# Patient Record
Sex: MALE | Race: ASIAN | NOT HISPANIC OR LATINO
[De-identification: names, ages, dates, MRNs, and addresses within clinical notes are randomized per-mention and may not be internally consistent; named-entity substitution may affect disease eponyms.]

---

## 2017-01-20 ENCOUNTER — APPOINTMENT (OUTPATIENT)
Dept: UROLOGY | Facility: CLINIC | Age: 77
End: 2017-01-20

## 2017-06-27 ENCOUNTER — RX RENEWAL (OUTPATIENT)
Age: 77
End: 2017-06-27

## 2017-07-20 ENCOUNTER — APPOINTMENT (OUTPATIENT)
Dept: UROLOGY | Facility: CLINIC | Age: 77
End: 2017-07-20

## 2017-07-20 VITALS
SYSTOLIC BLOOD PRESSURE: 120 MMHG | DIASTOLIC BLOOD PRESSURE: 74 MMHG | HEART RATE: 60 BPM | HEIGHT: 68 IN | RESPIRATION RATE: 17 BRPM | WEIGHT: 170 LBS | TEMPERATURE: 98.4 F | BODY MASS INDEX: 25.76 KG/M2

## 2017-07-20 DIAGNOSIS — F15.90 OTHER STIMULANT USE, UNSPECIFIED, UNCOMPLICATED: ICD-10-CM

## 2017-07-20 DIAGNOSIS — Z86.39 PERSONAL HISTORY OF OTHER ENDOCRINE, NUTRITIONAL AND METABOLIC DISEASE: ICD-10-CM

## 2017-07-20 DIAGNOSIS — Z80.1 FAMILY HISTORY OF MALIGNANT NEOPLASM OF TRACHEA, BRONCHUS AND LUNG: ICD-10-CM

## 2017-07-20 DIAGNOSIS — Z86.79 PERSONAL HISTORY OF OTHER DISEASES OF THE CIRCULATORY SYSTEM: ICD-10-CM

## 2017-07-20 DIAGNOSIS — Z82.3 FAMILY HISTORY OF STROKE: ICD-10-CM

## 2017-07-20 DIAGNOSIS — Z87.438 PERSONAL HISTORY OF OTHER DISEASES OF MALE GENITAL ORGANS: ICD-10-CM

## 2017-07-20 DIAGNOSIS — Z80.8 FAMILY HISTORY OF MALIGNANT NEOPLASM OF OTHER ORGANS OR SYSTEMS: ICD-10-CM

## 2017-07-26 LAB
APPEARANCE: CLEAR
BACTERIA UR CULT: ABNORMAL
BACTERIA: ABNORMAL
BILIRUBIN URINE: NEGATIVE
BLOOD URINE: NEGATIVE
COLOR: ABNORMAL
GLUCOSE QUALITATIVE U: 100
HYALINE CASTS: 0 /LPF
KETONES URINE: NEGATIVE
LEUKOCYTE ESTERASE URINE: NEGATIVE
MICROSCOPIC-UA: NORMAL
NITRITE URINE: POSITIVE
PH URINE: 7.5
PROTEIN URINE: NEGATIVE
RED BLOOD CELLS URINE: 1 /HPF
SPECIFIC GRAVITY URINE: 1.02
SQUAMOUS EPITHELIAL CELLS: 2 /HPF
UROBILINOGEN URINE: 1
WHITE BLOOD CELLS URINE: 6 /HPF

## 2017-08-03 ENCOUNTER — OTHER (OUTPATIENT)
Age: 77
End: 2017-08-03

## 2017-08-03 DIAGNOSIS — N39.0 URINARY TRACT INFECTION, SITE NOT SPECIFIED: ICD-10-CM

## 2018-02-12 ENCOUNTER — APPOINTMENT (OUTPATIENT)
Dept: UROLOGY | Facility: CLINIC | Age: 78
End: 2018-02-12
Payer: MEDICARE

## 2018-02-12 VITALS
WEIGHT: 169 LBS | TEMPERATURE: 97.5 F | BODY MASS INDEX: 25.61 KG/M2 | RESPIRATION RATE: 16 BRPM | DIASTOLIC BLOOD PRESSURE: 78 MMHG | HEART RATE: 61 BPM | HEIGHT: 68 IN | SYSTOLIC BLOOD PRESSURE: 132 MMHG

## 2018-02-12 PROCEDURE — 99213 OFFICE O/P EST LOW 20 MIN: CPT

## 2018-02-14 LAB
APPEARANCE: CLEAR
BACTERIA UR CULT: NORMAL
BACTERIA: NEGATIVE
BILIRUBIN URINE: NEGATIVE
BLOOD URINE: NEGATIVE
COLOR: ABNORMAL
GLUCOSE QUALITATIVE U: 250 MG/DL
KETONES URINE: NEGATIVE
LEUKOCYTE ESTERASE URINE: NEGATIVE
MICROSCOPIC-UA: NORMAL
NITRITE URINE: POSITIVE
PH URINE: 5.5
PROTEIN URINE: NEGATIVE MG/DL
RED BLOOD CELLS URINE: 1 /HPF
SPECIFIC GRAVITY URINE: 1.02
SQUAMOUS EPITHELIAL CELLS: 0 /HPF
UROBILINOGEN URINE: NEGATIVE MG/DL
WHITE BLOOD CELLS URINE: 1 /HPF

## 2018-06-27 ENCOUNTER — RX RENEWAL (OUTPATIENT)
Age: 78
End: 2018-06-27

## 2018-08-15 ENCOUNTER — APPOINTMENT (OUTPATIENT)
Dept: UROLOGY | Facility: CLINIC | Age: 78
End: 2018-08-15
Payer: MEDICARE

## 2018-08-15 VITALS
SYSTOLIC BLOOD PRESSURE: 144 MMHG | RESPIRATION RATE: 17 BRPM | OXYGEN SATURATION: 96 % | TEMPERATURE: 97.4 F | WEIGHT: 173 LBS | BODY MASS INDEX: 26.3 KG/M2 | HEART RATE: 73 BPM | DIASTOLIC BLOOD PRESSURE: 75 MMHG

## 2018-08-15 PROCEDURE — 99213 OFFICE O/P EST LOW 20 MIN: CPT

## 2019-02-13 ENCOUNTER — APPOINTMENT (OUTPATIENT)
Age: 79
End: 2019-02-13
Payer: MEDICARE

## 2019-02-13 VITALS
BODY MASS INDEX: 25.85 KG/M2 | SYSTOLIC BLOOD PRESSURE: 133 MMHG | TEMPERATURE: 97.9 F | OXYGEN SATURATION: 96 % | HEART RATE: 70 BPM | DIASTOLIC BLOOD PRESSURE: 67 MMHG | WEIGHT: 170 LBS | RESPIRATION RATE: 17 BRPM

## 2019-02-13 PROCEDURE — 99213 OFFICE O/P EST LOW 20 MIN: CPT | Mod: 25

## 2019-02-13 PROCEDURE — 51798 US URINE CAPACITY MEASURE: CPT

## 2019-02-13 RX ORDER — TADALAFIL 20 MG/1
20 TABLET, FILM COATED ORAL
Qty: 10 | Refills: 3 | Status: COMPLETED | COMMUNITY
Start: 2017-07-20 | End: 2019-02-13

## 2019-02-15 LAB
APPEARANCE: CLEAR
BACTERIA UR CULT: NORMAL
BACTERIA: NEGATIVE
BILIRUBIN URINE: NEGATIVE
BLOOD URINE: NEGATIVE
CALCIUM OXALATE CRYSTALS: ABNORMAL
COLOR: ABNORMAL
GLUCOSE QUALITATIVE U: NEGATIVE MG/DL
GRANULAR CASTS: 0 /LPF
HYALINE CASTS: 0 /LPF
KETONES URINE: NEGATIVE
LEUKOCYTE ESTERASE URINE: NEGATIVE
MICROSCOPIC-UA: NORMAL
NITRITE URINE: POSITIVE
PH URINE: 7
PROTEIN URINE: NEGATIVE MG/DL
RED BLOOD CELLS URINE: 0 /HPF
SPECIFIC GRAVITY URINE: 1.02
SQUAMOUS EPITHELIAL CELLS: 0 /HPF
TRIPLE PHOSPHATE CRYSTALS: NEGATIVE
URIC ACID CRYSTALS: NEGATIVE
UROBILINOGEN URINE: NEGATIVE MG/DL
WHITE BLOOD CELLS URINE: 1 /HPF

## 2019-02-15 NOTE — PHYSICAL EXAM
[General Appearance - Well Developed] : well developed [General Appearance - Well Nourished] : well nourished [Normal Appearance] : normal appearance [Well Groomed] : well groomed [General Appearance - In No Acute Distress] : no acute distress [Abdomen Soft] : soft [Abdomen Tenderness] : non-tender [Costovertebral Angle Tenderness] : no ~M costovertebral angle tenderness [Urethral Meatus] : meatus normal [Penis Abnormality] : normal circumcised penis [Urinary Bladder Findings] : the bladder was normal on palpation [Scrotum] : the scrotum was normal [Rectal Exam - Seminal Vesicles] : the seminal vesicles were normal [Epididymis] : the epididymides were normal [Testes Tenderness] : no tenderness of the testes [Testes Mass (___cm)] : there were no testicular masses [Anus Abnormality] : the anus and perineum were normal [Rectal Exam - Rectum] : digital rectal exam was normal [Prostate Tenderness] : the prostate was not tender [No Prostate Nodules] : no prostate nodules [Prostate Size ___ gm] : prostate size [unfilled] gm [Edema] : no peripheral edema [] : no respiratory distress [Respiration, Rhythm And Depth] : normal respiratory rhythm and effort [Exaggerated Use Of Accessory Muscles For Inspiration] : no accessory muscle use [Oriented To Time, Place, And Person] : oriented to person, place, and time [Affect] : the affect was normal [Mood] : the mood was normal [Not Anxious] : not anxious [Normal Station and Gait] : the gait and station were normal for the patient's age [No Focal Deficits] : no focal deficits [Motor Exam] : the motor exam was normal [No Palpable Adenopathy] : no palpable adenopathy

## 2019-02-15 NOTE — HISTORY OF PRESENT ILLNESS
[FreeTextEntry1] : 76 yo French speaking M with longstanding history of BPH for which he has been on tamsulosin 0.8mg and finasteride. Patient states that his LUTS has been relatively stable. Does continue to have some weak stream and nocturia (1-2/night) but not very bothersome. Denies any gross hematuria, dysuria, history of UTI, nephrolithiasis. \par \par Doing well with no issues since last visit.\par \par Interval history: No significant issues. Had Abd US done recently with PCP and was told he had a possible kidney stone

## 2019-02-15 NOTE — ASSESSMENT
[FreeTextEntry1] : 79 yo M with history of BPH, Possible renal stone on abd US per pt\par \par - PVR = 0ml\par - Continue tamsulosin and finasteride\par - Will obtain renal US report from PCP\par - UA and culture\par - FU in 6 months

## 2019-08-14 ENCOUNTER — APPOINTMENT (OUTPATIENT)
Age: 79
End: 2019-08-14
Payer: MEDICARE

## 2019-08-14 VITALS
WEIGHT: 167 LBS | DIASTOLIC BLOOD PRESSURE: 77 MMHG | HEART RATE: 72 BPM | OXYGEN SATURATION: 96 % | SYSTOLIC BLOOD PRESSURE: 159 MMHG | RESPIRATION RATE: 17 BRPM | TEMPERATURE: 97.4 F | BODY MASS INDEX: 25.39 KG/M2

## 2019-08-14 PROCEDURE — 99213 OFFICE O/P EST LOW 20 MIN: CPT

## 2019-08-14 RX ORDER — SULFAMETHOXAZOLE AND TRIMETHOPRIM 800; 160 MG/1; MG/1
800-160 TABLET ORAL TWICE DAILY
Qty: 10 | Refills: 0 | Status: COMPLETED | COMMUNITY
Start: 2017-08-03 | End: 2019-08-14

## 2019-08-14 NOTE — PHYSICAL EXAM
[General Appearance - Well Developed] : well developed [General Appearance - Well Nourished] : well nourished [Normal Appearance] : normal appearance [Well Groomed] : well groomed [General Appearance - In No Acute Distress] : no acute distress [Abdomen Soft] : soft [Abdomen Tenderness] : non-tender [Costovertebral Angle Tenderness] : no ~M costovertebral angle tenderness [Penis Abnormality] : normal circumcised penis [Urethral Meatus] : meatus normal [Scrotum] : the scrotum was normal [Urinary Bladder Findings] : the bladder was normal on palpation [Rectal Exam - Seminal Vesicles] : the seminal vesicles were normal [Epididymis] : the epididymides were normal [Testes Tenderness] : no tenderness of the testes [Testes Mass (___cm)] : there were no testicular masses [Anus Abnormality] : the anus and perineum were normal [Rectal Exam - Rectum] : digital rectal exam was normal [Prostate Tenderness] : the prostate was not tender [No Prostate Nodules] : no prostate nodules [Prostate Size ___ gm] : prostate size [unfilled] gm [Edema] : no peripheral edema [] : no respiratory distress [Respiration, Rhythm And Depth] : normal respiratory rhythm and effort [Exaggerated Use Of Accessory Muscles For Inspiration] : no accessory muscle use [Oriented To Time, Place, And Person] : oriented to person, place, and time [Affect] : the affect was normal [Mood] : the mood was normal [Not Anxious] : not anxious [Normal Station and Gait] : the gait and station were normal for the patient's age [No Focal Deficits] : no focal deficits [No Palpable Adenopathy] : no palpable adenopathy [Motor Exam] : the motor exam was normal

## 2019-08-16 NOTE — ASSESSMENT
[FreeTextEntry1] : 80 yo M with BPH\par \par - Continue dual medical therapy with tamsulosin and finasteride\par - FU in 6 months\par \par

## 2019-08-16 NOTE — HISTORY OF PRESENT ILLNESS
[FreeTextEntry1] : 76 yo Welsh speaking M with longstanding history of BPH for which he has been on tamsulosin 0.8mg and finasteride. Patient states that his LUTS has been relatively stable. Does continue to have some weak stream and nocturia (1-2/night) but not very bothersome. Denies any gross hematuria, dysuria, history of UTI, nephrolithiasis. \par \par 8/15/18 Interval history: Doing well with no issues since last visit.\par \par 2/13/19 Interval history: No significant issues. Had Abd US done recently with PCP and was told he had a possible kidney stone\par \par 8/16/19 Interval history: Doing well since last visit with stable LUTS. Abd US report actually showed gallstones, not kidney stones\par Interval history:

## 2020-02-12 ENCOUNTER — APPOINTMENT (OUTPATIENT)
Age: 80
End: 2020-02-12
Payer: MEDICARE

## 2020-02-12 VITALS
BODY MASS INDEX: 25.85 KG/M2 | HEART RATE: 67 BPM | SYSTOLIC BLOOD PRESSURE: 117 MMHG | DIASTOLIC BLOOD PRESSURE: 70 MMHG | WEIGHT: 170 LBS | RESPIRATION RATE: 17 BRPM | TEMPERATURE: 98.4 F | OXYGEN SATURATION: 96 %

## 2020-02-12 PROCEDURE — 99213 OFFICE O/P EST LOW 20 MIN: CPT

## 2020-02-12 NOTE — PHYSICAL EXAM
[General Appearance - Well Developed] : well developed [General Appearance - Well Nourished] : well nourished [Normal Appearance] : normal appearance [Well Groomed] : well groomed [General Appearance - In No Acute Distress] : no acute distress [Abdomen Soft] : soft [Abdomen Tenderness] : non-tender [Costovertebral Angle Tenderness] : no ~M costovertebral angle tenderness [Urethral Meatus] : meatus normal [Urinary Bladder Findings] : the bladder was normal on palpation [Penis Abnormality] : normal circumcised penis [Scrotum] : the scrotum was normal [Rectal Exam - Seminal Vesicles] : the seminal vesicles were normal [Epididymis] : the epididymides were normal [Testes Tenderness] : no tenderness of the testes [Testes Mass (___cm)] : there were no testicular masses [Anus Abnormality] : the anus and perineum were normal [Rectal Exam - Rectum] : digital rectal exam was normal [Prostate Tenderness] : the prostate was not tender [No Prostate Nodules] : no prostate nodules [Prostate Size ___ gm] : prostate size [unfilled] gm [Edema] : no peripheral edema [] : no respiratory distress [Respiration, Rhythm And Depth] : normal respiratory rhythm and effort [Exaggerated Use Of Accessory Muscles For Inspiration] : no accessory muscle use [Oriented To Time, Place, And Person] : oriented to person, place, and time [Affect] : the affect was normal [Mood] : the mood was normal [Not Anxious] : not anxious [Normal Station and Gait] : the gait and station were normal for the patient's age [No Focal Deficits] : no focal deficits [Motor Exam] : the motor exam was normal [No Palpable Adenopathy] : no palpable adenopathy

## 2020-02-12 NOTE — HISTORY OF PRESENT ILLNESS
[FreeTextEntry1] : 76 yo Mongolian speaking M with longstanding history of BPH for which he has been on tamsulosin 0.8mg and finasteride. Patient states that his LUTS has been relatively stable. Does continue to have some weak stream and nocturia (1-2/night) but not very bothersome. Denies any gross hematuria, dysuria, history of UTI, nephrolithiasis. \par \par 8/15/18 Interval history: Doing well with no issues since last visit.\par \par 2/13/19 Interval history: No significant issues. Had Abd US done recently with PCP and was told he had a possible kidney stone\par \par 8/16/19 Interval history: Doing well since last visit with stable LUTS. Abd US report actually showed gallstones, not kidney stones\par \par 2/12/20 Interval history: Changed PCP since last visit\par Stable LUTS but right sided hernia has been bothering him lately\par 1 week ago had labwork and urine test with PCP

## 2020-02-12 NOTE — ASSESSMENT
[FreeTextEntry1] : 80 yo M with BPH on dual medical therapy\par \par - Doing well with stable LUTS\par - Discussed with pt that if hernia continues to be bothersome, can consider surgical intervention with general surgery\par - FU in 6 months or as needed

## 2020-06-11 ENCOUNTER — RX RENEWAL (OUTPATIENT)
Age: 80
End: 2020-06-11

## 2020-08-12 ENCOUNTER — APPOINTMENT (OUTPATIENT)
Dept: UROLOGY | Facility: CLINIC | Age: 80
End: 2020-08-12
Payer: MEDICARE

## 2020-08-12 VITALS
BODY MASS INDEX: 24.94 KG/M2 | RESPIRATION RATE: 17 BRPM | WEIGHT: 164 LBS | HEART RATE: 63 BPM | DIASTOLIC BLOOD PRESSURE: 68 MMHG | OXYGEN SATURATION: 95 % | TEMPERATURE: 98.3 F | SYSTOLIC BLOOD PRESSURE: 122 MMHG

## 2020-08-12 PROCEDURE — 99213 OFFICE O/P EST LOW 20 MIN: CPT

## 2020-08-12 NOTE — PHYSICAL EXAM
[General Appearance - Well Developed] : well developed [Normal Appearance] : normal appearance [General Appearance - Well Nourished] : well nourished [Well Groomed] : well groomed [General Appearance - In No Acute Distress] : no acute distress [Abdomen Soft] : soft [Abdomen Tenderness] : non-tender [Costovertebral Angle Tenderness] : no ~M costovertebral angle tenderness [Urethral Meatus] : meatus normal [Penis Abnormality] : normal circumcised penis [Urinary Bladder Findings] : the bladder was normal on palpation [Scrotum] : the scrotum was normal [Rectal Exam - Seminal Vesicles] : the seminal vesicles were normal [Epididymis] : the epididymides were normal [Testes Tenderness] : no tenderness of the testes [Testes Mass (___cm)] : there were no testicular masses [Anus Abnormality] : the anus and perineum were normal [Rectal Exam - Rectum] : digital rectal exam was normal [Prostate Tenderness] : the prostate was not tender [No Prostate Nodules] : no prostate nodules [Prostate Size ___ gm] : prostate size [unfilled] gm [Edema] : no peripheral edema [] : no respiratory distress [Respiration, Rhythm And Depth] : normal respiratory rhythm and effort [Exaggerated Use Of Accessory Muscles For Inspiration] : no accessory muscle use [Oriented To Time, Place, And Person] : oriented to person, place, and time [Affect] : the affect was normal [Not Anxious] : not anxious [Mood] : the mood was normal [No Focal Deficits] : no focal deficits [Normal Station and Gait] : the gait and station were normal for the patient's age [Motor Exam] : the motor exam was normal [No Palpable Adenopathy] : no palpable adenopathy [FreeTextEntry1] : deferred today

## 2020-08-12 NOTE — HISTORY OF PRESENT ILLNESS
[FreeTextEntry1] : 76 yo Albanian speaking M with longstanding history of BPH for which he has been on tamsulosin 0.8mg and finasteride. Patient states that his LUTS has been relatively stable. Does continue to have some weak stream and nocturia (1-2/night) but not very bothersome. Denies any gross hematuria, dysuria, history of UTI, nephrolithiasis. \par \par 8/15/18 Interval history: Doing well with no issues since last visit.\par \par 2/13/19 Interval history: No significant issues. Had Abd US done recently with PCP and was told he had a possible kidney stone\par \par 8/16/19 Interval history: Doing well since last visit with stable LUTS. Abd US report actually showed gallstones, not kidney stones\par \par 2/12/20 Interval history: Changed PCP since last visit\par Stable LUTS but right sided hernia has been bothering him lately\par 1 week ago had labwork and urine test with PCP\par \par 8/12/20 Interval history: PCP advised pt to stop finasteride and cut down tamsulosin to once daily\par Has noticed some worsening of urine flow over the last month since changing meds\par No recent gross hematuria, dysuria

## 2020-08-17 LAB
APPEARANCE: CLEAR
BILIRUBIN URINE: NEGATIVE
BLOOD URINE: NEGATIVE
COLOR: YELLOW
GLUCOSE QUALITATIVE U: ABNORMAL
KETONES URINE: NEGATIVE
LEUKOCYTE ESTERASE URINE: NEGATIVE
NITRITE URINE: NEGATIVE
PH URINE: 6
PROTEIN URINE: NEGATIVE
SPECIFIC GRAVITY URINE: 1.01
UROBILINOGEN URINE: NORMAL

## 2020-12-15 PROBLEM — N39.0 ACUTE UTI: Status: RESOLVED | Noted: 2017-08-03 | Resolved: 2020-12-15

## 2021-02-03 ENCOUNTER — APPOINTMENT (OUTPATIENT)
Dept: OPHTHALMOLOGY | Facility: CLINIC | Age: 81
End: 2021-02-03

## 2021-02-10 ENCOUNTER — APPOINTMENT (OUTPATIENT)
Age: 81
End: 2021-02-10
Payer: MEDICARE

## 2021-02-10 VITALS
TEMPERATURE: 97.1 F | BODY MASS INDEX: 25.24 KG/M2 | OXYGEN SATURATION: 95 % | HEART RATE: 69 BPM | DIASTOLIC BLOOD PRESSURE: 56 MMHG | SYSTOLIC BLOOD PRESSURE: 142 MMHG | WEIGHT: 166 LBS | RESPIRATION RATE: 17 BRPM

## 2021-02-10 PROCEDURE — 99072 ADDL SUPL MATRL&STAF TM PHE: CPT

## 2021-02-10 PROCEDURE — 99213 OFFICE O/P EST LOW 20 MIN: CPT

## 2021-02-10 NOTE — HISTORY OF PRESENT ILLNESS
[FreeTextEntry1] : 78 yo Hebrew speaking M with longstanding history of BPH for which he has been on tamsulosin 0.8mg and finasteride. Patient states that his LUTS has been relatively stable. Does continue to have some weak stream and nocturia (1-2/night) but not very bothersome. Denies any gross hematuria, dysuria, history of UTI, nephrolithiasis. \par \par 2/10/21 Interval history: Still taking tamsulosin 0.4mg\par Pt state LUTS has not been bothersome lately with OK flow and no straining\par Recently had labwork done by PCP on 1/26 and was told so rise in Cr\par PSA was 2.28 and normal UA\par Pending endocrine workup for his DM2\par

## 2021-02-10 NOTE — ASSESSMENT
[FreeTextEntry1] : 81 yo M with stable chronic BPH\par \par - Reviewed labwork done by PCP in Jan 2021 as above\par - Discussed other options for his BPH including dual medical therapy vs surgical interventions. Given stability of symptoms, continue tamsulosin 0.4mg\par - Reaffirmed behavioral modifications\par - FU in 6 months

## 2021-02-10 NOTE — PHYSICAL EXAM
[General Appearance - Well Developed] : well developed [General Appearance - Well Nourished] : well nourished [Normal Appearance] : normal appearance [Well Groomed] : well groomed [General Appearance - In No Acute Distress] : no acute distress [Abdomen Soft] : soft [Abdomen Tenderness] : non-tender [Costovertebral Angle Tenderness] : no ~M costovertebral angle tenderness [Urethral Meatus] : meatus normal [Penis Abnormality] : normal circumcised penis [Urinary Bladder Findings] : the bladder was normal on palpation [Scrotum] : the scrotum was normal [Rectal Exam - Seminal Vesicles] : the seminal vesicles were normal [Testes Tenderness] : no tenderness of the testes [Epididymis] : the epididymides were normal [Testes Mass (___cm)] : there were no testicular masses [Anus Abnormality] : the anus and perineum were normal [Rectal Exam - Rectum] : digital rectal exam was normal [Prostate Tenderness] : the prostate was not tender [No Prostate Nodules] : no prostate nodules [Prostate Size ___ gm] : prostate size [unfilled] gm [FreeTextEntry1] : deferred today [Edema] : no peripheral edema [] : no respiratory distress [Respiration, Rhythm And Depth] : normal respiratory rhythm and effort [Exaggerated Use Of Accessory Muscles For Inspiration] : no accessory muscle use [Oriented To Time, Place, And Person] : oriented to person, place, and time [Affect] : the affect was normal [Mood] : the mood was normal [Not Anxious] : not anxious [Normal Station and Gait] : the gait and station were normal for the patient's age [No Focal Deficits] : no focal deficits [Motor Exam] : the motor exam was normal [No Palpable Adenopathy] : no palpable adenopathy

## 2021-02-17 ENCOUNTER — APPOINTMENT (OUTPATIENT)
Dept: GASTROENTEROLOGY | Facility: CLINIC | Age: 81
End: 2021-02-17
Payer: MEDICARE

## 2021-02-17 VITALS
HEIGHT: 67 IN | BODY MASS INDEX: 24.8 KG/M2 | DIASTOLIC BLOOD PRESSURE: 80 MMHG | OXYGEN SATURATION: 98 % | TEMPERATURE: 99 F | SYSTOLIC BLOOD PRESSURE: 110 MMHG | WEIGHT: 158 LBS | HEART RATE: 87 BPM

## 2021-02-17 PROCEDURE — 99202 OFFICE O/P NEW SF 15 MIN: CPT

## 2021-02-17 PROCEDURE — 99072 ADDL SUPL MATRL&STAF TM PHE: CPT

## 2021-02-17 PROCEDURE — 99212 OFFICE O/P EST SF 10 MIN: CPT

## 2021-02-17 RX ORDER — BLOOD SUGAR DIAGNOSTIC
STRIP MISCELLANEOUS
Qty: 100 | Refills: 0 | Status: ACTIVE | COMMUNITY
Start: 2020-12-11

## 2021-02-17 RX ORDER — GLIMEPIRIDE 4 MG/1
4 TABLET ORAL
Refills: 0 | Status: COMPLETED | COMMUNITY
End: 2021-02-17

## 2021-02-17 NOTE — PHYSICAL EXAM
[General Appearance - Alert] : alert [General Appearance - In No Acute Distress] : in no acute distress [Sclera] : the sclera and conjunctiva were normal [Outer Ear] : the ears and nose were normal in appearance [Neck Appearance] : the appearance of the neck was normal [] : no respiratory distress [Exaggerated Use Of Accessory Muscles For Inspiration] : no accessory muscle use [Auscultation Breath Sounds / Voice Sounds] : lungs were clear to auscultation bilaterally [Heart Rate And Rhythm] : heart rate was normal and rhythm regular [Heart Sounds] : normal S1 and S2 [Murmurs] : no murmurs [Edema] : there was no peripheral edema [Abdomen Soft] : soft [Abdomen Tenderness] : non-tender [Abdomen Mass (___ Cm)] : no abdominal mass palpated [No Spinal Tenderness] : no spinal tenderness [Involuntary Movements] : no involuntary movements were seen [Skin Color & Pigmentation] : normal skin color and pigmentation [No Focal Deficits] : no focal deficits [Oriented To Time, Place, And Person] : oriented to person, place, and time [Affect] : the affect was normal [Mood] : the mood was normal [FreeTextEntry1] : R

## 2021-02-17 NOTE — HISTORY OF PRESENT ILLNESS
[Heartburn] : denies heartburn [Nausea] : denies nausea [Vomiting] : denies vomiting [Diarrhea] : denies diarrhea [Constipation] : denies constipation [Yellow Skin Or Eyes (Jaundice)] : denies jaundice [Abdominal Pain] : denies abdominal pain [Abdominal Swelling] : denies abdominal swelling [Wt Loss ___ Lbs] : no recent weight loss [de-identified] : This is an 80 year old male with BPH, HTN, HLD, who presents for evaluation of inguinal hernia.\par \par The patient has felt a lump in his right groin for at least 10 years now. He reports it's non-tender and it's always reducible. He feels it when he puts his pants on. He has not had previous abdominal surgery before. He denies any nausea/vomiting. He has no abdominal pain. He has been having regular bowel movements daily, formed, without any constipation. He has had regular colonoscopies every 3-5 years by Dr. Chirinos and reports the last one was unremarkable. \par \par 2/9/21: glucose 137, BUN 28, Cr 1.18, Na 137, K 3.9, Cl 99, Bicarb 29, Ca 9.5

## 2021-02-17 NOTE — ASSESSMENT
[FreeTextEntry1] : Impression:\par 1. Right inguinal hernia - reducible at this time\par \par Plan:\par -Discussed what an inguinal hernia is, and of the potential risk for progression and incarceration\par -will refer him to general surgery to discuss risks/benefits of potential repair, as I'm not a surgeon\par -Discussed with him and his wife over the phone of signs of incarceration such as pain, vomiting where he would have to go right away to the ER for evaluation. All questions answered.

## 2021-03-25 ENCOUNTER — APPOINTMENT (OUTPATIENT)
Dept: SURGERY | Facility: CLINIC | Age: 81
End: 2021-03-25
Payer: MEDICARE

## 2021-03-25 VITALS — BODY MASS INDEX: 25.27 KG/M2 | HEIGHT: 67 IN | WEIGHT: 161 LBS

## 2021-03-25 PROCEDURE — 99072 ADDL SUPL MATRL&STAF TM PHE: CPT

## 2021-03-25 PROCEDURE — 99203 OFFICE O/P NEW LOW 30 MIN: CPT

## 2021-03-25 NOTE — PHYSICAL EXAM
[Normal Breath Sounds] : Normal breath sounds [No Rash or Lesion] : No rash or lesion [Alert] : alert [Calm] : calm [JVD] : no jugular venous distention  [de-identified] : healthy [de-identified] : normal [de-identified] : mildly protuberant abdomen\par  [de-identified] : normal testicles [de-identified] : right inguinal hernia, reducible; umbilical hernia, reducible

## 2021-03-25 NOTE — ASSESSMENT
[FreeTextEntry1] : Gallo Reddy is a pleasant 81-year-old gentleman with past medical history significant for well-controlled hypertension, hypercholesterolemia, diabetes and BPH presenting to the office with his wife with pain and swelling in the right groin worsening over the last several months suspicious for a hernia.\par \par Physical examination demonstrates a large tender plum-sized bulge in the right groin which is reducible with a moderate degree of difficulty consistent with a large symptomatic protruding bowel-containing right inguinal hernia warranting surgical repair. There is no evidence of incarceration or strangulation, and the patient denies any symptoms of obstruction.  Examination of his left groin demonstrates a mild to moderate weakness but no obvious hernia. Both testicles are normal. His umbilical examination demonstrates a small to moderate size mildly tender easily reducible umbilical hernia which deserves concomitant repair. His current BMI is 25.\par \par I explained the pros and cons of surgery, as well as all risks, benefits, indications and alternatives of the procedure and the patient understood and agreed. Gallo Reddy was scheduled for the repair of his right inguinal hernia with mesh and repair of his umbilical hernia with mesh on Friday, April 30, 2021 under LOCAL with IV SEDATION at [facility] with presurgical testing waived.  He was encouraged to avoid heavy lifting and strenuous activity in the interim, of course.

## 2021-03-25 NOTE — CONSULT LETTER
[Dear  ___] : Dear  [unfilled], [Courtesy Letter:] : I had the pleasure of seeing your patient, [unfilled], in my office today. [Please see my note below.] : Please see my note below. [Consult Closing:] : Thank you very much for allowing me to participate in the care of this patient.  If you have any questions, please do not hesitate to contact me. [DrFran  ___] : Dr. DARNELL [DrFran ___] : Dr. DARNELL [FreeTextEntry3] : Respectfully,\par \par Hira Cordova M.D., FACS\par

## 2021-04-27 ENCOUNTER — LABORATORY RESULT (OUTPATIENT)
Age: 81
End: 2021-04-27

## 2021-04-27 ENCOUNTER — OUTPATIENT (OUTPATIENT)
Dept: OUTPATIENT SERVICES | Facility: HOSPITAL | Age: 81
LOS: 1 days | Discharge: HOME | End: 2021-04-27

## 2021-04-27 DIAGNOSIS — Z11.59 ENCOUNTER FOR SCREENING FOR OTHER VIRAL DISEASES: ICD-10-CM

## 2021-04-29 NOTE — ASU PATIENT PROFILE, ADULT - PMH
Benign prostate hyperplasia    Diabetes  type 2  Hypertension     Benign prostate hyperplasia    Diabetes  type 2  High cholesterol    Hypertension

## 2021-04-30 ENCOUNTER — APPOINTMENT (OUTPATIENT)
Dept: SURGERY | Facility: AMBULATORY SURGERY CENTER | Age: 81
End: 2021-04-30
Payer: MEDICARE

## 2021-04-30 ENCOUNTER — OUTPATIENT (OUTPATIENT)
Dept: OUTPATIENT SERVICES | Facility: HOSPITAL | Age: 81
LOS: 1 days | Discharge: HOME | End: 2021-04-30

## 2021-04-30 VITALS
HEART RATE: 50 BPM | OXYGEN SATURATION: 99 % | RESPIRATION RATE: 16 BRPM | SYSTOLIC BLOOD PRESSURE: 168 MMHG | DIASTOLIC BLOOD PRESSURE: 69 MMHG

## 2021-04-30 VITALS
SYSTOLIC BLOOD PRESSURE: 162 MMHG | HEART RATE: 53 BPM | WEIGHT: 160.06 LBS | DIASTOLIC BLOOD PRESSURE: 70 MMHG | HEIGHT: 67 IN | RESPIRATION RATE: 18 BRPM | OXYGEN SATURATION: 99 % | TEMPERATURE: 98 F

## 2021-04-30 PROCEDURE — 49585: CPT | Mod: XS

## 2021-04-30 PROCEDURE — 49505 PRP I/HERN INIT REDUC >5 YR: CPT | Mod: RT

## 2021-04-30 RX ORDER — OXYCODONE HYDROCHLORIDE 5 MG/1
5 TABLET ORAL ONCE
Refills: 0 | Status: DISCONTINUED | OUTPATIENT
Start: 2021-04-30 | End: 2021-04-30

## 2021-04-30 RX ORDER — TRAMADOL HYDROCHLORIDE 50 MG/1
1 TABLET ORAL
Qty: 30 | Refills: 0
Start: 2021-04-30 | End: 2021-05-04

## 2021-04-30 RX ORDER — SODIUM CHLORIDE 9 MG/ML
1000 INJECTION, SOLUTION INTRAVENOUS
Refills: 0 | Status: DISCONTINUED | OUTPATIENT
Start: 2021-04-30 | End: 2021-05-14

## 2021-04-30 RX ORDER — ONDANSETRON 8 MG/1
4 TABLET, FILM COATED ORAL ONCE
Refills: 0 | Status: DISCONTINUED | OUTPATIENT
Start: 2021-04-30 | End: 2021-05-14

## 2021-04-30 RX ORDER — HYDROMORPHONE HYDROCHLORIDE 2 MG/ML
0.5 INJECTION INTRAMUSCULAR; INTRAVENOUS; SUBCUTANEOUS
Refills: 0 | Status: DISCONTINUED | OUTPATIENT
Start: 2021-04-30 | End: 2021-04-30

## 2021-04-30 RX ADMIN — SODIUM CHLORIDE 75 MILLILITER(S): 9 INJECTION, SOLUTION INTRAVENOUS at 11:40

## 2021-04-30 NOTE — ASU DISCHARGE PLAN (ADULT/PEDIATRIC) - DISCHARGE TO
Urinary Tract Infection  A urinary tract infection (UTI) can occur any place along the urinary tract. The tract includes the kidneys, ureters, bladder, and urethra. A type of germ called bacteria often causes a UTI. UTIs are often helped with antibiotic medicine.   HOME CARE   · If given, take antibiotics as told by your doctor. Finish them even if you start to feel better.  · Drink enough fluids to keep your pee (urine) clear or pale yellow.  · Avoid tea, drinks with caffeine, and bubbly (carbonated) drinks.  · Pee often. Avoid holding your pee in for a long time.  · Pee before and after having sex (intercourse).  · Wipe from front to back after you poop (bowel movement) if you are a woman. Use each tissue only once.  GET HELP RIGHT AWAY IF:   · You have back pain.  · You have lower belly (abdominal) pain.  · You have chills.  · You feel sick to your stomach (nauseous).  · You throw up (vomit).  · Your burning or discomfort with peeing does not go away.  · You have a fever.  · Your symptoms are not better in 3 days.  MAKE SURE YOU:   · Understand these instructions.  · Will watch your condition.  · Will get help right away if you are not doing well or get worse.     This information is not intended to replace advice given to you by your health care provider. Make sure you discuss any questions you have with your health care provider.     Document Released: 06/05/2009 Document Revised: 01/08/2016 Document Reviewed: 07/18/2013  [employer] Patient Education ©2016 [employer]  e  
Home

## 2021-04-30 NOTE — BRIEF OPERATIVE NOTE - NSICDXBRIEFPROCEDURE_GEN_ALL_CORE_FT
PROCEDURES:  Repair of right inguinal hernia with mesh 30-Apr-2021 09:24:48  Hira Cordova  Umbilical hernia repair with mesh 30-Apr-2021 09:24:50 NO MESH Hira Cordova

## 2021-04-30 NOTE — ASU DISCHARGE PLAN (ADULT/PEDIATRIC) - CARE PROVIDER_API CALL
Hira Cordova)  Surgery  [address]  [place] [address]  Phone: [phone]  Fax: [phone]  Scheduled Appointment: 05/11/2021

## 2021-04-30 NOTE — BRIEF OPERATIVE NOTE - NSICDXBRIEFPOSTOP_GEN_ALL_CORE_FT
POST-OP DIAGNOSIS:  Inguinal hernia, right 30-Apr-2021 09:24:43  Hira Cordova  Umbilical hernia 30-Apr-2021 09:24:44  Hira Cordova

## 2021-04-30 NOTE — BRIEF OPERATIVE NOTE - NSICDXBRIEFPREOP_GEN_ALL_CORE_FT
PRE-OP DIAGNOSIS:  Inguinal hernia, right 30-Apr-2021 09:24:39  Hira Cordova  Umbilical hernia 30-Apr-2021 09:24:40  Hira Cordova

## 2021-04-30 NOTE — CHART NOTE - NSCHARTNOTEFT_GEN_A_CORE
PACU ANESTHESIA ADMISSION NOTE      Procedure: Repair of right inguinal hernia with mesh    Umbilical hernia repair with mesh  NO MESH      Post op diagnosis:  Inguinal hernia, right    Umbilical hernia        ____  Intubated  TV:______       Rate: ______      FiO2: ______    __x__  Patent Airway    __x__  Full return of protective reflexes    __x__  Full recovery from anesthesia / back to baseline status    Vitals  HR: 55  BP: 140/62  RR: 15  O2 Sat: 96  Temp: 97.1    Mental Status:  ____ Awake   _____ Alert   __x___ Drowsy   _____ Sedated    Nausea/Vomiting:  __x__ NO  ______Yes,   See Post - Op Orders          Pain Scale (0-10):  _____    Treatment: ____ None    __x__ See Post - Op/PCA Orders    Post - Operative Fluids:   ____ Oral   __x__ See Post - Op Orders    Plan: Discharge when criteria met:   _x___Home       _____Floor     _____Critical Care   Other:_________________    Comments: Patient had smooth intraoperative event, no anesthesia complication.

## 2021-05-08 DIAGNOSIS — Z79.84 LONG TERM (CURRENT) USE OF ORAL HYPOGLYCEMIC DRUGS: ICD-10-CM

## 2021-05-08 DIAGNOSIS — I10 ESSENTIAL (PRIMARY) HYPERTENSION: ICD-10-CM

## 2021-05-08 DIAGNOSIS — K40.90 UNILATERAL INGUINAL HERNIA, WITHOUT OBSTRUCTION OR GANGRENE, NOT SPECIFIED AS RECURRENT: ICD-10-CM

## 2021-05-08 DIAGNOSIS — N40.0 BENIGN PROSTATIC HYPERPLASIA WITHOUT LOWER URINARY TRACT SYMPTOMS: ICD-10-CM

## 2021-05-08 DIAGNOSIS — K42.9 UMBILICAL HERNIA WITHOUT OBSTRUCTION OR GANGRENE: ICD-10-CM

## 2021-05-08 DIAGNOSIS — E11.9 TYPE 2 DIABETES MELLITUS WITHOUT COMPLICATIONS: ICD-10-CM

## 2021-05-11 ENCOUNTER — APPOINTMENT (OUTPATIENT)
Dept: SURGERY | Facility: CLINIC | Age: 81
End: 2021-05-11
Payer: MEDICARE

## 2021-05-11 VITALS — BODY MASS INDEX: 25.93 KG/M2 | HEIGHT: 67 IN | WEIGHT: 165.2 LBS

## 2021-05-11 DIAGNOSIS — K42.9 UMBILICAL HERNIA W/OUT OBSTRUCTION OR GANGRENE: ICD-10-CM

## 2021-05-11 DIAGNOSIS — K40.90 UNILATERAL INGUINAL HERNIA, W/OUT OBSTRUCTION OR GANGRENE, NOT SPECIFIED AS RECURRENT: ICD-10-CM

## 2021-05-11 PROCEDURE — 99024 POSTOP FOLLOW-UP VISIT: CPT

## 2021-05-11 NOTE — CONSULT LETTER
[FreeTextEntry1] : Dear Dr. Piotr Burgess, \par \par I had the pleasure of seeing your patient, SERA BARGER, in my office today. Please see my note below. \par \par Thank you very much for allowing me to participate in the care of this patient. If you have any questions, please do not hesitate to contact me. \par \par \par Respectfully,\par \par Hira Cordova M.D., FACS\par  \par \par \par \par cc: Dr. Austin Peterson \par Dr. Kasie Castrejon

## 2021-05-11 NOTE — ASSESSMENT
[FreeTextEntry1] : Gallo Reddy underwent the repair of his very large indirect right inguinal hernia with mesh and the repair of his small umbilical hernia on April 30, 2021 under local with IV sedation without any problems or complications. His wounds are clean, dry and intact. There is no evidence of erythema, seroma formation or infection. He is tolerating a diet and having normal bowel movements. He denies any significant postoperative pain or discomfort at this time.\par \par He and his wife were counseled and reassured. Gallo Reddy was discharged from the office with no specific followup necessary, but he knows to avoid any heavy lifting or strenuous activity for the next several weeks.

## 2021-08-06 ENCOUNTER — APPOINTMENT (OUTPATIENT)
Dept: UROLOGY | Facility: CLINIC | Age: 81
End: 2021-08-06
Payer: MEDICARE

## 2021-08-06 PROBLEM — N40.0 BENIGN PROSTATIC HYPERPLASIA WITHOUT LOWER URINARY TRACT SYMPTOMS: Chronic | Status: ACTIVE | Noted: 2021-04-30

## 2021-08-06 PROBLEM — E78.00 PURE HYPERCHOLESTEROLEMIA, UNSPECIFIED: Chronic | Status: ACTIVE | Noted: 2021-04-30

## 2021-08-06 PROBLEM — I10 ESSENTIAL (PRIMARY) HYPERTENSION: Chronic | Status: ACTIVE | Noted: 2021-04-30

## 2021-08-06 PROBLEM — E11.9 TYPE 2 DIABETES MELLITUS WITHOUT COMPLICATIONS: Chronic | Status: ACTIVE | Noted: 2021-04-30

## 2021-08-06 PROCEDURE — 99213 OFFICE O/P EST LOW 20 MIN: CPT

## 2021-08-06 NOTE — PHYSICAL EXAM
[General Appearance - Well Developed] : well developed [General Appearance - Well Nourished] : well nourished [Normal Appearance] : normal appearance [Well Groomed] : well groomed [General Appearance - In No Acute Distress] : no acute distress [Abdomen Soft] : soft [Abdomen Tenderness] : non-tender [Costovertebral Angle Tenderness] : no ~M costovertebral angle tenderness [Urethral Meatus] : meatus normal [Penis Abnormality] : normal circumcised penis [Urinary Bladder Findings] : the bladder was normal on palpation [Scrotum] : the scrotum was normal [Rectal Exam - Seminal Vesicles] : the seminal vesicles were normal [Epididymis] : the epididymides were normal [Testes Tenderness] : no tenderness of the testes [Testes Mass (___cm)] : there were no testicular masses [Anus Abnormality] : the anus and perineum were normal [Rectal Exam - Rectum] : digital rectal exam was normal [Prostate Tenderness] : the prostate was not tender [No Prostate Nodules] : no prostate nodules [Prostate Size ___ gm] : prostate size [unfilled] gm [Edema] : no peripheral edema [] : no respiratory distress [Respiration, Rhythm And Depth] : normal respiratory rhythm and effort [Exaggerated Use Of Accessory Muscles For Inspiration] : no accessory muscle use [Affect] : the affect was normal [Oriented To Time, Place, And Person] : oriented to person, place, and time [Mood] : the mood was normal [Not Anxious] : not anxious [Normal Station and Gait] : the gait and station were normal for the patient's age [No Focal Deficits] : no focal deficits [Motor Exam] : the motor exam was normal [No Palpable Adenopathy] : no palpable adenopathy

## 2021-08-06 NOTE — ASSESSMENT
[FreeTextEntry1] : 82 yo M with BPH, LUTS, ED\par \par - PVR = 46ml\par - OK to just continue tamsulosin 0.4mg but discussed studies that do show higher risk of urinary retention on single medical therapy vs dual medical therapy for BPH. Of note, PVR at previous visits were 0ml.\par - Discussed options again for his ED. Pt would like to continue observation at this time\par - Discussed possible etiologies for LUTS. Discussed ways to manage them including behavioral modifications such as adequate hydration, controlling constipation, restricting fluids in the evening\par - UA\par - FU in 6 months

## 2021-08-06 NOTE — HISTORY OF PRESENT ILLNESS
[FreeTextEntry1] : 78 yo Tajik speaking M with longstanding history of BPH for which he has been on tamsulosin 0.8mg and finasteride. Patient states that his LUTS has been relatively stable. Does continue to have some weak stream and nocturia (1-2/night) but not very bothersome. Denies any gross hematuria, dysuria, history of UTI, nephrolithiasis. \par \par 2/10/21 Interval history: Still taking tamsulosin 0.4mg\par Pt state LUTS has not been bothersome lately with OK flow and no straining\par Recently had labwork done by PCP on 1/26 and was told so rise in Cr\par PSA was 2.28 and normal UA\par Pending endocrine workup for his DM2\par \par 8/6/21 Interval history PCP stopped finasteride and reduced tamsulosin\par Some slight weakening of stream but no huge effect on LUTS \par Still changing around meds for DM2\par ED issues - erections not adequate for penetration\par In the past, was prescribed meds but never got them due to pricing.\par s/p inguinal hernia repair in the interim

## 2021-08-13 ENCOUNTER — RX RENEWAL (OUTPATIENT)
Age: 81
End: 2021-08-13

## 2021-08-13 RX ORDER — FINASTERIDE 5 MG/1
5 TABLET, FILM COATED ORAL
Qty: 90 | Refills: 3 | Status: ACTIVE | COMMUNITY
Start: 2021-08-13 | End: 1900-01-01

## 2021-08-16 LAB
APPEARANCE: CLEAR
BILIRUBIN URINE: NEGATIVE
BLOOD URINE: NEGATIVE
COLOR: YELLOW
GLUCOSE QUALITATIVE U: ABNORMAL
KETONES URINE: NEGATIVE
LEUKOCYTE ESTERASE URINE: NEGATIVE
NITRITE URINE: NEGATIVE
PH URINE: 6
PROTEIN URINE: NEGATIVE
SPECIFIC GRAVITY URINE: 1.02
UROBILINOGEN URINE: NORMAL

## 2022-02-11 ENCOUNTER — APPOINTMENT (OUTPATIENT)
Age: 82
End: 2022-02-11
Payer: MEDICARE

## 2022-02-11 VITALS
SYSTOLIC BLOOD PRESSURE: 143 MMHG | DIASTOLIC BLOOD PRESSURE: 70 MMHG | OXYGEN SATURATION: 97 % | WEIGHT: 168 LBS | BODY MASS INDEX: 26.37 KG/M2 | HEIGHT: 67 IN | HEART RATE: 67 BPM | RESPIRATION RATE: 16 BRPM

## 2022-02-11 PROCEDURE — 99213 OFFICE O/P EST LOW 20 MIN: CPT

## 2022-02-21 NOTE — HISTORY OF PRESENT ILLNESS
[FreeTextEntry1] : 78 yo Kinyarwanda speaking M with longstanding history of BPH for which he has been on tamsulosin 0.8mg and finasteride. Patient states that his LUTS has been relatively stable. Does continue to have some weak stream and nocturia (1-2/night) but not very bothersome. Denies any gross hematuria, dysuria, history of UTI, nephrolithiasis. \par \par 2/10/21 Interval history: Still taking tamsulosin 0.4mg\par Pt state LUTS has not been bothersome lately with OK flow and no straining\par Recently had labwork done by PCP on 1/26 and was told so rise in Cr\par PSA was 2.28 and normal UA\par Pending endocrine workup for his DM2\par \par 8/6/21 Interval history PCP stopped finasteride and reduced tamsulosin\par Some slight weakening of stream but no huge effect on LUTS \par Still changing around meds for DM2\par ED issues - erections not adequate for penetration\par In the past, was prescribed meds but never got them due to pricing.\par s/p inguinal hernia repair in the interim\par \par 2/11/22 Interval history: Doing well since last visit\par Still with some nocturia but not bothersome on tamsulosin\par PCP labwork 1/21 - normal Cr, a1c 6.8, normal UA\par

## 2022-02-21 NOTE — ASSESSMENT
[FreeTextEntry1] : 82 yo M with stable chronic BPH\par \par - Continue tamsulosin\par - Reaffirmed behavioral modifications for LUTS control\par - FU in 6 months

## 2022-02-21 NOTE — PHYSICAL EXAM
[General Appearance - Well Developed] : well developed [General Appearance - Well Nourished] : well nourished [Normal Appearance] : normal appearance [Well Groomed] : well groomed [General Appearance - In No Acute Distress] : no acute distress [Abdomen Soft] : soft [Abdomen Tenderness] : non-tender [Costovertebral Angle Tenderness] : no ~M costovertebral angle tenderness [Urethral Meatus] : meatus normal [Penis Abnormality] : normal circumcised penis [Urinary Bladder Findings] : the bladder was normal on palpation [Scrotum] : the scrotum was normal [Rectal Exam - Seminal Vesicles] : the seminal vesicles were normal [Testes Tenderness] : no tenderness of the testes [Epididymis] : the epididymides were normal [Testes Mass (___cm)] : there were no testicular masses [Anus Abnormality] : the anus and perineum were normal [Rectal Exam - Rectum] : digital rectal exam was normal [Prostate Tenderness] : the prostate was not tender [No Prostate Nodules] : no prostate nodules [Prostate Size ___ gm] : prostate size [unfilled] gm [FreeTextEntry1] : deferred today [Edema] : no peripheral edema [] : no respiratory distress [Respiration, Rhythm And Depth] : normal respiratory rhythm and effort [Exaggerated Use Of Accessory Muscles For Inspiration] : no accessory muscle use [Oriented To Time, Place, And Person] : oriented to person, place, and time [Mood] : the mood was normal [Affect] : the affect was normal [Not Anxious] : not anxious [Normal Station and Gait] : the gait and station were normal for the patient's age [No Focal Deficits] : no focal deficits [Motor Exam] : the motor exam was normal [No Palpable Adenopathy] : no palpable adenopathy

## 2022-08-12 ENCOUNTER — APPOINTMENT (OUTPATIENT)
Dept: UROLOGY | Facility: CLINIC | Age: 82
End: 2022-08-12

## 2022-08-12 VITALS
RESPIRATION RATE: 16 BRPM | DIASTOLIC BLOOD PRESSURE: 70 MMHG | HEART RATE: 66 BPM | TEMPERATURE: 97.6 F | OXYGEN SATURATION: 97 % | SYSTOLIC BLOOD PRESSURE: 124 MMHG

## 2022-08-12 PROCEDURE — 99213 OFFICE O/P EST LOW 20 MIN: CPT

## 2022-08-17 NOTE — ASSESSMENT
[FreeTextEntry1] : 80 yo M with BPH\par \par - PVR = 54ml\par - Continue dual medical therapy\par - FU in 6 months

## 2022-08-17 NOTE — HISTORY OF PRESENT ILLNESS
[FreeTextEntry1] : 76 yo Mohawk speaking M with longstanding history of BPH for which he has been on tamsulosin 0.8mg and finasteride. Patient states that his LUTS has been relatively stable. Does continue to have some weak stream and nocturia (1-2/night) but not very bothersome. Denies any gross hematuria, dysuria, history of UTI, nephrolithiasis. \par \par 2/10/21 Interval history: Still taking tamsulosin 0.4mg\par Pt state LUTS has not been bothersome lately with OK flow and no straining\par Recently had labwork done by PCP on 1/26 and was told so rise in Cr\par PSA was 2.28 and normal UA\par Pending endocrine workup for his DM2\par \par 8/6/21 Interval history PCP stopped finasteride and reduced tamsulosin\par Some slight weakening of stream but no huge effect on LUTS \par Still changing around meds for DM2\par ED issues - erections not adequate for penetration\par In the past, was prescribed meds but never got them due to pricing.\par s/p inguinal hernia repair in the interim\par \par 2/11/22 Interval history: Doing well since last visit\par Still with some nocturia but not bothersome on tamsulosin\par PCP labwork 1/21 - normal Cr, a1c 6.8, normal UA\par \par 8/12/22 Interval history: Doing well\par on dual medical therapy\par no nocturia if restricting fluids in the evening\par

## 2023-02-17 ENCOUNTER — APPOINTMENT (OUTPATIENT)
Age: 83
End: 2023-02-17
Payer: MEDICARE

## 2023-02-17 VITALS
SYSTOLIC BLOOD PRESSURE: 112 MMHG | OXYGEN SATURATION: 96 % | RESPIRATION RATE: 16 BRPM | DIASTOLIC BLOOD PRESSURE: 55 MMHG | TEMPERATURE: 98.5 F | HEART RATE: 56 BPM

## 2023-02-17 PROCEDURE — 99213 OFFICE O/P EST LOW 20 MIN: CPT

## 2023-02-21 LAB
APPEARANCE: CLEAR
BILIRUBIN URINE: NEGATIVE
BLOOD URINE: NEGATIVE
COLOR: NORMAL
GLUCOSE QUALITATIVE U: ABNORMAL
KETONES URINE: NEGATIVE
LEUKOCYTE ESTERASE URINE: NEGATIVE
NITRITE URINE: NEGATIVE
PH URINE: 5.5
PROTEIN URINE: NEGATIVE
SPECIFIC GRAVITY URINE: 1.02
UROBILINOGEN URINE: NORMAL

## 2023-02-26 NOTE — HISTORY OF PRESENT ILLNESS
[FreeTextEntry1] : 76 yo Sami speaking M with longstanding history of BPH for which he has been on tamsulosin 0.8mg and finasteride. Patient states that his LUTS has been relatively stable. Does continue to have some weak stream and nocturia (1-2/night) but not very bothersome. Denies any gross hematuria, dysuria, history of UTI, nephrolithiasis. \par \par 2/10/21 Interval history: Still taking tamsulosin 0.4mg\par Pt state LUTS has not been bothersome lately with OK flow and no straining\par Recently had labwork done by PCP on 1/26 and was told so rise in Cr\par PSA was 2.28 and normal UA\par Pending endocrine workup for his DM2\par \par 8/6/21 Interval history PCP stopped finasteride and reduced tamsulosin\par Some slight weakening of stream but no huge effect on LUTS \par Still changing around meds for DM2\par ED issues - erections not adequate for penetration\par In the past, was prescribed meds but never got them due to pricing.\par s/p inguinal hernia repair in the interim\par \par 2/11/22 Interval history: Doing well since last visit\par Still with some nocturia but not bothersome on tamsulosin\par PCP labwork 1/21 - normal Cr, a1c 6.8, normal UA\par \par 8/12/22 Interval history: Doing well\par on dual medical therapy\par no nocturia if restricting fluids in the evening\par \par 2/17/23 Interval history: some weak flow but no nocturia\par Doing well on dual medical therapy\par

## 2023-02-26 NOTE — ASSESSMENT
[FreeTextEntry1] : 81 yo M with stable chronic BPH\par \par - PVR = 62ml\par - COntinue dual medical therapy\par - UA\par - FU in 6 months

## 2023-04-13 DIAGNOSIS — Z01.818 ENCOUNTER FOR OTHER PREPROCEDURAL EXAMINATION: ICD-10-CM

## 2023-04-19 VITALS
SYSTOLIC BLOOD PRESSURE: 133 MMHG | RESPIRATION RATE: 15 BRPM | TEMPERATURE: 98 F | DIASTOLIC BLOOD PRESSURE: 67 MMHG | HEART RATE: 63 BPM | OXYGEN SATURATION: 95 % | WEIGHT: 154.98 LBS | HEIGHT: 68 IN

## 2023-04-19 RX ORDER — LOSARTAN/HYDROCHLOROTHIAZIDE 100MG-25MG
0 TABLET ORAL
Qty: 0 | Refills: 0 | DISCHARGE

## 2023-04-19 RX ORDER — ATORVASTATIN CALCIUM 80 MG/1
0 TABLET, FILM COATED ORAL
Qty: 0 | Refills: 0 | DISCHARGE

## 2023-04-19 RX ORDER — CHLORHEXIDINE GLUCONATE 213 G/1000ML
1 SOLUTION TOPICAL ONCE
Refills: 0 | Status: DISCONTINUED | OUTPATIENT
Start: 2023-04-21 | End: 2023-04-22

## 2023-04-19 RX ORDER — TAMSULOSIN HYDROCHLORIDE 0.4 MG/1
0 CAPSULE ORAL
Qty: 0 | Refills: 0 | DISCHARGE

## 2023-04-19 RX ORDER — METFORMIN HYDROCHLORIDE 850 MG/1
0 TABLET ORAL
Qty: 0 | Refills: 0 | DISCHARGE

## 2023-04-19 NOTE — H&P ADULT - NSICDXPASTMEDICALHX_GEN_ALL_CORE_FT
PAST MEDICAL HISTORY:  Benign prostate hyperplasia     Diabetes type 2    High cholesterol     Hypertension

## 2023-04-19 NOTE — H&P ADULT - NSHPLABSRESULTS_GEN_ALL_CORE
13.8   6.93  )-----------( 248      ( 21 Apr 2023 13:35 )             41.7       04-21    141  |  104  |  29<H>  ----------------------------<  129<H>  3.9   |  26  |  1.04    Ca    9.3      21 Apr 2023 13:35  Mg     2.2     04-21    TPro  7.0  /  Alb  4.2  /  TBili  0.6  /  DBili  x   /  AST  18  /  ALT  13  /  AlkPhos  138<H>  04-21      PT/INR - ( 21 Apr 2023 13:35 )   PT: 12.0 sec;   INR: 1.01          PTT - ( 21 Apr 2023 13:35 )  PTT:27.6 sec    CARDIAC MARKERS ( 21 Apr 2023 13:35 )  x     / x     / 90 U/L / x     / 2.2 ng/mL            EKG (outpatient done in office-3/21/23: NSR at 66 bpm with 1st degree AV block. No ST or T changes.

## 2023-04-19 NOTE — H&P ADULT - NSICDXPASTSURGICALHX_GEN_ALL_CORE_FT
PAST SURGICAL HISTORY:  No significant past surgical history      PAST SURGICAL HISTORY:  History of inguinal hernia repair

## 2023-04-19 NOTE — H&P ADULT - ASSESSMENT
83 year old Maori/English speaking M with PMHx HTN, HLD, DM Type II, and AI who presents for cardiac cath due to patient's risk factors, CCS Angina Class II-III Symptoms and abnormal CCTA with significantly elevated calcium score patient now presents for cardiac cath with possible intervention if clinically indicated.    ASA III          Mallampati III  Patient is a candidate for sedation: yes  Sedation: Moderate    Risks & benefits of procedure and alternative therapy have been explained to the patient including but not limited to: allergic reaction, bleeding w/possible need for blood transfusion, infection, renal and vascular compromise, limb damage, arrhythmia, stroke, vessel dissection/perforation, Myocardial infarction, emergent CABG. Informed consent obtained and in chart.       Hgb/HCT and Platelets normal. Patient denies bleeding, BRBPR, melena, hematuria, hematemesis. Patient currently takes Aspirin 81 mg daily and reports compliance last dose PM. ASA EC 81 mg PO x 1 given prior to procedure. Plavix 600 mg PO x 1 given prior to procedure.       EF normal and patient euvolemic on exam. No history of CHF.  cc Bolus IV x 1 followed by NS 75 cc/hr x 2 hrs per Hydration Protocol.

## 2023-04-19 NOTE — H&P ADULT - HISTORY OF PRESENT ILLNESS
Pharmacy: [facility] ([facility])  Cardiologist: Dr Oscar Cohen  Escort:    83 year old M with PMHx HTN, HLD, NIDDM, CAD on CTA, and AI, presented to Dr Oscar Cohen with c/o worsening CP and SOB, CCS class ____. Patient also recently had a syncopal episode - while on a ladder, patient reportedly lost consciousness and fell about 5 feet, striking his head. He refused to go to ER at that time. Patient recently underwent CCTA (4/10/23) revealing mild non-obstructive CAD , however, extensive calcification and associated artifact limiting evaluation of proximal and mid left anterior descending artery, with calcium score of 1041 corresponding to 90%. incidental finding of indeterminate 3 mm LLL pulmonary nodule. Patient also underwent TTE (3/21/23) revealing normal global wall motion with EF 57%, mild AR.     In light of risk factors, CCS class ____ anginal symptoms and abnormal/indeterminate CCTA, pt now presents for cardiac catheterization with possible intervention if clinically indicated.  Pharmacy: [facility] ([facility])  Cardiologist: Dr Oscar Cohen  Escort:    83 year old M with PMHx HTN, HLD, NIDDM, CAD on CTA, and AI, presented to Dr Oscar Cohen with c/o worsening CP and SOB, CCS class ____. Patient also recently had a syncopal episode - while on a ladder, patient reportedly lost consciousness and fell about 5 feet, striking his head. He refused to go to ER at that time. Patient recently underwent CCTA (4/10/23) revealing mild non-obstructive CAD , however, extensive calcification and associated artifact limiting evaluation of proximal and mid left anterior descending artery, with calcium score of 1041 corresponding to 90%. incidental finding of indeterminate 3 mm LLL pulmonary nodule. Patient also underwent TTE (3/21/23) revealing normal global wall motion with EF 57%, mild AR. Patient otherwise denies _______ CP, SOB, dizziness, palpitations, orthopnea/PND, leg swelling, LOC, bleeding, melena/hematochezia, fever, chills, URI symptoms, or recent illness.     In light of risk factors, CCS class ____ anginal symptoms and abnormal/indeterminate CCTA, pt now presents for cardiac catheterization with possible intervention if clinically indicated.  Pharmacy: [facility] -medications confirmed with patient and wife at bedside  Cardiologist: Dr Oscar Cohen  Escort: Wife and Son    83 year old Mongolian/English speaking M with PMHx HTN, HLD, DM Type II, and AI, presented to Dr Oscar Cohen with c/o worsening CP and SOB per Dr. Cohen's note.  Patient also recently had a syncopal episode - while on a ladder, patient reportedly lost consciousness and fell about 5 feet, striking his head. He refused to go to ER at that time. Patient recently underwent CCTA (4/10/23) revealing mild non-obstructive CAD , however, extensive calcification and associated artifact limiting evaluation of proximal and mid left anterior descending artery, with calcium score of 1041 corresponding to 90%. incidental finding of indeterminate 3 mm LLL pulmonary nodule. Patient also underwent TTE (3/21/23) revealing normal global wall motion with EF 57%, mild AR. Patient otherwise denies dizziness, palpitations, diaphoresis, N/V, orthopnea/PND, leg swelling,  bleeding, melena/hematochezia, fever, chills, URI symptoms, or recent illness.     In light of risk factors, CCS class Angina Class II-III symptoms and abnormal CCTA with significantly elevated calcium score , pt now presents for cardiac catheterization with possible intervention if clinically indicated.

## 2023-04-21 ENCOUNTER — INPATIENT (INPATIENT)
Facility: HOSPITAL | Age: 83
LOS: 0 days | Discharge: HOME | DRG: 247 | End: 2023-04-22
Attending: INTERNAL MEDICINE | Admitting: INTERNAL MEDICINE
Payer: MEDICARE

## 2023-04-21 DIAGNOSIS — Z98.890 OTHER SPECIFIED POSTPROCEDURAL STATES: Chronic | ICD-10-CM

## 2023-04-21 LAB
A1C WITH ESTIMATED AVERAGE GLUCOSE RESULT: 6.6 % — HIGH (ref 4–5.6)
ALBUMIN SERPL ELPH-MCNC: 4.2 G/DL — SIGNIFICANT CHANGE UP (ref 3.3–5)
ALP SERPL-CCNC: 138 U/L — HIGH (ref 40–120)
ALT FLD-CCNC: 13 U/L — SIGNIFICANT CHANGE UP (ref 10–45)
ANION GAP SERPL CALC-SCNC: 11 MMOL/L — SIGNIFICANT CHANGE UP (ref 5–17)
APTT BLD: 27.6 SEC — SIGNIFICANT CHANGE UP (ref 27.5–35.5)
AST SERPL-CCNC: 18 U/L — SIGNIFICANT CHANGE UP (ref 10–40)
BASOPHILS # BLD AUTO: 0.07 K/UL — SIGNIFICANT CHANGE UP (ref 0–0.2)
BASOPHILS NFR BLD AUTO: 1 % — SIGNIFICANT CHANGE UP (ref 0–2)
BILIRUB SERPL-MCNC: 0.6 MG/DL — SIGNIFICANT CHANGE UP (ref 0.2–1.2)
BUN SERPL-MCNC: 29 MG/DL — HIGH (ref 7–23)
CALCIUM SERPL-MCNC: 9.3 MG/DL — SIGNIFICANT CHANGE UP (ref 8.4–10.5)
CHLORIDE SERPL-SCNC: 104 MMOL/L — SIGNIFICANT CHANGE UP (ref 96–108)
CHOLEST SERPL-MCNC: 140 MG/DL — SIGNIFICANT CHANGE UP
CK MB CFR SERPL CALC: 2.2 NG/ML — SIGNIFICANT CHANGE UP (ref 0–6.7)
CK SERPL-CCNC: 90 U/L — SIGNIFICANT CHANGE UP (ref 30–200)
CO2 SERPL-SCNC: 26 MMOL/L — SIGNIFICANT CHANGE UP (ref 22–31)
CREAT SERPL-MCNC: 1.04 MG/DL — SIGNIFICANT CHANGE UP (ref 0.5–1.3)
EGFR: 71 ML/MIN/1.73M2 — SIGNIFICANT CHANGE UP
EOSINOPHIL # BLD AUTO: 0.2 K/UL — SIGNIFICANT CHANGE UP (ref 0–0.5)
EOSINOPHIL NFR BLD AUTO: 2.9 % — SIGNIFICANT CHANGE UP (ref 0–6)
ESTIMATED AVERAGE GLUCOSE: 143 MG/DL — HIGH (ref 68–114)
GLUCOSE BLDC GLUCOMTR-MCNC: 103 MG/DL — HIGH (ref 70–99)
GLUCOSE BLDC GLUCOMTR-MCNC: 270 MG/DL — HIGH (ref 70–99)
GLUCOSE BLDC GLUCOMTR-MCNC: 98 MG/DL — SIGNIFICANT CHANGE UP (ref 70–99)
GLUCOSE SERPL-MCNC: 129 MG/DL — HIGH (ref 70–99)
HCT VFR BLD CALC: 41.7 % — SIGNIFICANT CHANGE UP (ref 39–50)
HDLC SERPL-MCNC: 59 MG/DL — SIGNIFICANT CHANGE UP
HGB BLD-MCNC: 13.8 G/DL — SIGNIFICANT CHANGE UP (ref 13–17)
IMM GRANULOCYTES NFR BLD AUTO: 0.1 % — SIGNIFICANT CHANGE UP (ref 0–0.9)
INR BLD: 1.01 — SIGNIFICANT CHANGE UP (ref 0.88–1.16)
LIPID PNL WITH DIRECT LDL SERPL: 68 MG/DL — SIGNIFICANT CHANGE UP
LYMPHOCYTES # BLD AUTO: 1.72 K/UL — SIGNIFICANT CHANGE UP (ref 1–3.3)
LYMPHOCYTES # BLD AUTO: 24.8 % — SIGNIFICANT CHANGE UP (ref 13–44)
MAGNESIUM SERPL-MCNC: 2.2 MG/DL — SIGNIFICANT CHANGE UP (ref 1.6–2.6)
MCHC RBC-ENTMCNC: 29.1 PG — SIGNIFICANT CHANGE UP (ref 27–34)
MCHC RBC-ENTMCNC: 33.1 GM/DL — SIGNIFICANT CHANGE UP (ref 32–36)
MCV RBC AUTO: 88 FL — SIGNIFICANT CHANGE UP (ref 80–100)
MONOCYTES # BLD AUTO: 0.59 K/UL — SIGNIFICANT CHANGE UP (ref 0–0.9)
MONOCYTES NFR BLD AUTO: 8.5 % — SIGNIFICANT CHANGE UP (ref 2–14)
NEUTROPHILS # BLD AUTO: 4.34 K/UL — SIGNIFICANT CHANGE UP (ref 1.8–7.4)
NEUTROPHILS NFR BLD AUTO: 62.7 % — SIGNIFICANT CHANGE UP (ref 43–77)
NON HDL CHOLESTEROL: 81 MG/DL — SIGNIFICANT CHANGE UP
NRBC # BLD: 0 /100 WBCS — SIGNIFICANT CHANGE UP (ref 0–0)
PLATELET # BLD AUTO: 248 K/UL — SIGNIFICANT CHANGE UP (ref 150–400)
POTASSIUM SERPL-MCNC: 3.9 MMOL/L — SIGNIFICANT CHANGE UP (ref 3.5–5.3)
POTASSIUM SERPL-SCNC: 3.9 MMOL/L — SIGNIFICANT CHANGE UP (ref 3.5–5.3)
PROT SERPL-MCNC: 7 G/DL — SIGNIFICANT CHANGE UP (ref 6–8.3)
PROTHROM AB SERPL-ACNC: 12 SEC — SIGNIFICANT CHANGE UP (ref 10.5–13.4)
RBC # BLD: 4.74 M/UL — SIGNIFICANT CHANGE UP (ref 4.2–5.8)
RBC # FLD: 12.4 % — SIGNIFICANT CHANGE UP (ref 10.3–14.5)
SODIUM SERPL-SCNC: 141 MMOL/L — SIGNIFICANT CHANGE UP (ref 135–145)
TRIGL SERPL-MCNC: 64 MG/DL — SIGNIFICANT CHANGE UP
WBC # BLD: 6.93 K/UL — SIGNIFICANT CHANGE UP (ref 3.8–10.5)
WBC # FLD AUTO: 6.93 K/UL — SIGNIFICANT CHANGE UP (ref 3.8–10.5)

## 2023-04-21 PROCEDURE — 92933 PRQ TRLML C ATHRC ST ANGIOP1: CPT | Mod: LD

## 2023-04-21 PROCEDURE — 93458 L HRT ARTERY/VENTRICLE ANGIO: CPT | Mod: 26,59

## 2023-04-21 PROCEDURE — 92978 ENDOLUMINL IVUS OCT C 1ST: CPT | Mod: 26,LD

## 2023-04-21 PROCEDURE — 99152 MOD SED SAME PHYS/QHP 5/>YRS: CPT

## 2023-04-21 RX ORDER — ASPIRIN/CALCIUM CARB/MAGNESIUM 324 MG
81 TABLET ORAL ONCE
Refills: 0 | Status: COMPLETED | OUTPATIENT
Start: 2023-04-21 | End: 2023-04-21

## 2023-04-21 RX ORDER — ASPIRIN/CALCIUM CARB/MAGNESIUM 324 MG
81 TABLET ORAL DAILY
Refills: 0 | Status: DISCONTINUED | OUTPATIENT
Start: 2023-04-21 | End: 2023-04-22

## 2023-04-21 RX ORDER — LOSARTAN POTASSIUM 100 MG/1
100 TABLET, FILM COATED ORAL DAILY
Refills: 0 | Status: DISCONTINUED | OUTPATIENT
Start: 2023-04-22 | End: 2023-04-22

## 2023-04-21 RX ORDER — DEXTROSE 50 % IN WATER 50 %
25 SYRINGE (ML) INTRAVENOUS ONCE
Refills: 0 | Status: DISCONTINUED | OUTPATIENT
Start: 2023-04-21 | End: 2023-04-21

## 2023-04-21 RX ORDER — SODIUM CHLORIDE 9 MG/ML
1000 INJECTION, SOLUTION INTRAVENOUS
Refills: 0 | Status: DISCONTINUED | OUTPATIENT
Start: 2023-04-21 | End: 2023-04-21

## 2023-04-21 RX ORDER — CLOPIDOGREL BISULFATE 75 MG/1
75 TABLET, FILM COATED ORAL DAILY
Refills: 0 | Status: DISCONTINUED | OUTPATIENT
Start: 2023-04-22 | End: 2023-04-22

## 2023-04-21 RX ORDER — TAMSULOSIN HYDROCHLORIDE 0.4 MG/1
1 CAPSULE ORAL
Refills: 0 | DISCHARGE

## 2023-04-21 RX ORDER — DEXTROSE 50 % IN WATER 50 %
15 SYRINGE (ML) INTRAVENOUS ONCE
Refills: 0 | Status: DISCONTINUED | OUTPATIENT
Start: 2023-04-21 | End: 2023-04-22

## 2023-04-21 RX ORDER — DEXTROSE 50 % IN WATER 50 %
12.5 SYRINGE (ML) INTRAVENOUS ONCE
Refills: 0 | Status: DISCONTINUED | OUTPATIENT
Start: 2023-04-21 | End: 2023-04-22

## 2023-04-21 RX ORDER — DEXTROSE 50 % IN WATER 50 %
25 SYRINGE (ML) INTRAVENOUS ONCE
Refills: 0 | Status: DISCONTINUED | OUTPATIENT
Start: 2023-04-21 | End: 2023-04-22

## 2023-04-21 RX ORDER — GLUCAGON INJECTION, SOLUTION 0.5 MG/.1ML
1 INJECTION, SOLUTION SUBCUTANEOUS ONCE
Refills: 0 | Status: DISCONTINUED | OUTPATIENT
Start: 2023-04-21 | End: 2023-04-21

## 2023-04-21 RX ORDER — FINASTERIDE 5 MG/1
1 TABLET, FILM COATED ORAL
Refills: 0 | DISCHARGE

## 2023-04-21 RX ORDER — SODIUM CHLORIDE 9 MG/ML
500 INJECTION INTRAMUSCULAR; INTRAVENOUS; SUBCUTANEOUS
Refills: 0 | Status: DISCONTINUED | OUTPATIENT
Start: 2023-04-21 | End: 2023-04-21

## 2023-04-21 RX ORDER — GLUCAGON INJECTION, SOLUTION 0.5 MG/.1ML
1 INJECTION, SOLUTION SUBCUTANEOUS ONCE
Refills: 0 | Status: DISCONTINUED | OUTPATIENT
Start: 2023-04-21 | End: 2023-04-22

## 2023-04-21 RX ORDER — POTASSIUM CHLORIDE 20 MEQ
20 PACKET (EA) ORAL ONCE
Refills: 0 | Status: COMPLETED | OUTPATIENT
Start: 2023-04-21 | End: 2023-04-21

## 2023-04-21 RX ORDER — ATORVASTATIN CALCIUM 80 MG/1
10 TABLET, FILM COATED ORAL AT BEDTIME
Refills: 0 | Status: DISCONTINUED | OUTPATIENT
Start: 2023-04-21 | End: 2023-04-22

## 2023-04-21 RX ORDER — METFORMIN HYDROCHLORIDE 850 MG/1
1 TABLET ORAL
Refills: 0 | DISCHARGE

## 2023-04-21 RX ORDER — INSULIN LISPRO 100/ML
VIAL (ML) SUBCUTANEOUS
Refills: 0 | Status: DISCONTINUED | OUTPATIENT
Start: 2023-04-21 | End: 2023-04-22

## 2023-04-21 RX ORDER — SODIUM CHLORIDE 9 MG/ML
500 INJECTION INTRAMUSCULAR; INTRAVENOUS; SUBCUTANEOUS
Refills: 0 | Status: DISCONTINUED | OUTPATIENT
Start: 2023-04-21 | End: 2023-04-22

## 2023-04-21 RX ORDER — INSULIN LISPRO 100/ML
VIAL (ML) SUBCUTANEOUS ONCE
Refills: 0 | Status: DISCONTINUED | OUTPATIENT
Start: 2023-04-21 | End: 2023-04-21

## 2023-04-21 RX ORDER — FINASTERIDE 5 MG/1
5 TABLET, FILM COATED ORAL DAILY
Refills: 0 | Status: DISCONTINUED | OUTPATIENT
Start: 2023-04-21 | End: 2023-04-22

## 2023-04-21 RX ORDER — LOSARTAN/HYDROCHLOROTHIAZIDE 100MG-25MG
1 TABLET ORAL
Refills: 0 | DISCHARGE

## 2023-04-21 RX ORDER — SODIUM CHLORIDE 9 MG/ML
1000 INJECTION, SOLUTION INTRAVENOUS
Refills: 0 | Status: DISCONTINUED | OUTPATIENT
Start: 2023-04-21 | End: 2023-04-22

## 2023-04-21 RX ORDER — TAMSULOSIN HYDROCHLORIDE 0.4 MG/1
0.4 CAPSULE ORAL AT BEDTIME
Refills: 0 | Status: DISCONTINUED | OUTPATIENT
Start: 2023-04-21 | End: 2023-04-22

## 2023-04-21 RX ORDER — ATORVASTATIN CALCIUM 80 MG/1
1 TABLET, FILM COATED ORAL
Refills: 0 | DISCHARGE

## 2023-04-21 RX ORDER — SODIUM CHLORIDE 9 MG/ML
250 INJECTION INTRAMUSCULAR; INTRAVENOUS; SUBCUTANEOUS ONCE
Refills: 0 | Status: COMPLETED | OUTPATIENT
Start: 2023-04-21 | End: 2023-04-21

## 2023-04-21 RX ORDER — CLOPIDOGREL BISULFATE 75 MG/1
600 TABLET, FILM COATED ORAL ONCE
Refills: 0 | Status: COMPLETED | OUTPATIENT
Start: 2023-04-21 | End: 2023-04-21

## 2023-04-21 RX ORDER — DEXTROSE 50 % IN WATER 50 %
12.5 SYRINGE (ML) INTRAVENOUS ONCE
Refills: 0 | Status: DISCONTINUED | OUTPATIENT
Start: 2023-04-21 | End: 2023-04-21

## 2023-04-21 RX ORDER — INSULIN LISPRO 100/ML
VIAL (ML) SUBCUTANEOUS AT BEDTIME
Refills: 0 | Status: DISCONTINUED | OUTPATIENT
Start: 2023-04-21 | End: 2023-04-22

## 2023-04-21 RX ORDER — DEXTROSE 50 % IN WATER 50 %
15 SYRINGE (ML) INTRAVENOUS ONCE
Refills: 0 | Status: DISCONTINUED | OUTPATIENT
Start: 2023-04-21 | End: 2023-04-21

## 2023-04-21 RX ADMIN — SODIUM CHLORIDE 500 MILLILITER(S): 9 INJECTION INTRAMUSCULAR; INTRAVENOUS; SUBCUTANEOUS at 15:25

## 2023-04-21 RX ADMIN — ATORVASTATIN CALCIUM 10 MILLIGRAM(S): 80 TABLET, FILM COATED ORAL at 22:19

## 2023-04-21 RX ADMIN — TAMSULOSIN HYDROCHLORIDE 0.4 MILLIGRAM(S): 0.4 CAPSULE ORAL at 22:19

## 2023-04-21 RX ADMIN — SODIUM CHLORIDE 75 MILLILITER(S): 9 INJECTION INTRAMUSCULAR; INTRAVENOUS; SUBCUTANEOUS at 15:25

## 2023-04-21 RX ADMIN — Medication 81 MILLIGRAM(S): at 15:25

## 2023-04-21 RX ADMIN — SODIUM CHLORIDE 210 MILLILITER(S): 9 INJECTION INTRAMUSCULAR; INTRAVENOUS; SUBCUTANEOUS at 18:59

## 2023-04-21 RX ADMIN — Medication 20 MILLIEQUIVALENT(S): at 15:25

## 2023-04-21 RX ADMIN — CLOPIDOGREL BISULFATE 600 MILLIGRAM(S): 75 TABLET, FILM COATED ORAL at 15:54

## 2023-04-21 RX ADMIN — Medication 2: at 22:20

## 2023-04-21 RX ADMIN — SODIUM CHLORIDE 210 MILLILITER(S): 9 INJECTION INTRAMUSCULAR; INTRAVENOUS; SUBCUTANEOUS at 22:55

## 2023-04-21 NOTE — PATIENT PROFILE ADULT - FALL HARM RISK - CONCLUSION
Pt states he requested a wheelchair, Dr Gilberto Calderón sent a prescription for one to [facility] they told pt the script is saying he needs an appt with pcp before he can get one , pt said he had a VV on Monday 07/19- please advise   Pt also wanted Dr to know he was trying to walk and fell so he is in need of the wheelchair Fall with Harm Risk

## 2023-04-21 NOTE — PATIENT PROFILE ADULT - FALL HARM RISK - HARM RISK INTERVENTIONS
Assistance with ambulation/Assistance OOB with selected safe patient handling equipment/Communicate Risk of Fall with Harm to all staff/Discuss with provider need for PT consult/Monitor gait and stability/Provide patient with walking aids - walker, cane, crutches/Reinforce activity limits and safety measures with patient and family/Sit up slowly, dangle for a short time, stand at bedside before walking/Tailored Fall Risk Interventions/Use of alarms - bed, chair and/or voice tab/Visual Cue: Yellow wristband and red socks/Bed in lowest position, wheels locked, appropriate side rails in place/Call bell, personal items and telephone in reach/Instruct patient to call for assistance before getting out of bed or chair/Non-slip footwear when patient is out of bed/[place] to call system/Physically safe environment - no spills, clutter or unnecessary equipment/Purposeful Proactive Rounding/Room/bathroom lighting operational, light cord in reach

## 2023-04-22 ENCOUNTER — TRANSCRIPTION ENCOUNTER (OUTPATIENT)
Age: 83
End: 2023-04-22

## 2023-04-22 VITALS — TEMPERATURE: 98 F

## 2023-04-22 LAB
ANION GAP SERPL CALC-SCNC: 8 MMOL/L — SIGNIFICANT CHANGE UP (ref 5–17)
BUN SERPL-MCNC: 27 MG/DL — HIGH (ref 7–23)
CALCIUM SERPL-MCNC: 8.6 MG/DL — SIGNIFICANT CHANGE UP (ref 8.4–10.5)
CHLORIDE SERPL-SCNC: 105 MMOL/L — SIGNIFICANT CHANGE UP (ref 96–108)
CO2 SERPL-SCNC: 27 MMOL/L — SIGNIFICANT CHANGE UP (ref 22–31)
CREAT SERPL-MCNC: 1.15 MG/DL — SIGNIFICANT CHANGE UP (ref 0.5–1.3)
EGFR: 63 ML/MIN/1.73M2 — SIGNIFICANT CHANGE UP
GLUCOSE BLDC GLUCOMTR-MCNC: 121 MG/DL — HIGH (ref 70–99)
GLUCOSE BLDC GLUCOMTR-MCNC: 162 MG/DL — HIGH (ref 70–99)
GLUCOSE SERPL-MCNC: 133 MG/DL — HIGH (ref 70–99)
HCT VFR BLD CALC: 41.1 % — SIGNIFICANT CHANGE UP (ref 39–50)
HGB BLD-MCNC: 13.3 G/DL — SIGNIFICANT CHANGE UP (ref 13–17)
MAGNESIUM SERPL-MCNC: 2 MG/DL — SIGNIFICANT CHANGE UP (ref 1.6–2.6)
MCHC RBC-ENTMCNC: 29 PG — SIGNIFICANT CHANGE UP (ref 27–34)
MCHC RBC-ENTMCNC: 32.4 GM/DL — SIGNIFICANT CHANGE UP (ref 32–36)
MCV RBC AUTO: 89.5 FL — SIGNIFICANT CHANGE UP (ref 80–100)
NRBC # BLD: 0 /100 WBCS — SIGNIFICANT CHANGE UP (ref 0–0)
PLATELET # BLD AUTO: 250 K/UL — SIGNIFICANT CHANGE UP (ref 150–400)
POTASSIUM SERPL-MCNC: 4.3 MMOL/L — SIGNIFICANT CHANGE UP (ref 3.5–5.3)
POTASSIUM SERPL-SCNC: 4.3 MMOL/L — SIGNIFICANT CHANGE UP (ref 3.5–5.3)
RBC # BLD: 4.59 M/UL — SIGNIFICANT CHANGE UP (ref 4.2–5.8)
RBC # FLD: 12.4 % — SIGNIFICANT CHANGE UP (ref 10.3–14.5)
SODIUM SERPL-SCNC: 140 MMOL/L — SIGNIFICANT CHANGE UP (ref 135–145)
WBC # BLD: 9.25 K/UL — SIGNIFICANT CHANGE UP (ref 3.8–10.5)
WBC # FLD AUTO: 9.25 K/UL — SIGNIFICANT CHANGE UP (ref 3.8–10.5)

## 2023-04-22 PROCEDURE — C1894: CPT

## 2023-04-22 PROCEDURE — 83735 ASSAY OF MAGNESIUM: CPT

## 2023-04-22 PROCEDURE — C1724: CPT

## 2023-04-22 PROCEDURE — 82550 ASSAY OF CK (CPK): CPT

## 2023-04-22 PROCEDURE — C1753: CPT

## 2023-04-22 PROCEDURE — 83036 HEMOGLOBIN GLYCOSYLATED A1C: CPT

## 2023-04-22 PROCEDURE — C1725: CPT

## 2023-04-22 PROCEDURE — 36415 COLL VENOUS BLD VENIPUNCTURE: CPT

## 2023-04-22 PROCEDURE — 85027 COMPLETE CBC AUTOMATED: CPT

## 2023-04-22 PROCEDURE — 85025 COMPLETE CBC W/AUTO DIFF WBC: CPT

## 2023-04-22 PROCEDURE — 99239 HOSP IP/OBS DSCHRG MGMT >30: CPT

## 2023-04-22 PROCEDURE — 80048 BASIC METABOLIC PNL TOTAL CA: CPT

## 2023-04-22 PROCEDURE — 80053 COMPREHEN METABOLIC PANEL: CPT

## 2023-04-22 PROCEDURE — 85347 COAGULATION TIME ACTIVATED: CPT

## 2023-04-22 PROCEDURE — 85730 THROMBOPLASTIN TIME PARTIAL: CPT

## 2023-04-22 PROCEDURE — 82962 GLUCOSE BLOOD TEST: CPT

## 2023-04-22 PROCEDURE — 85610 PROTHROMBIN TIME: CPT

## 2023-04-22 PROCEDURE — 82553 CREATINE MB FRACTION: CPT

## 2023-04-22 PROCEDURE — 80061 LIPID PANEL: CPT

## 2023-04-22 PROCEDURE — C1874: CPT

## 2023-04-22 PROCEDURE — C1760: CPT

## 2023-04-22 PROCEDURE — C1769: CPT

## 2023-04-22 PROCEDURE — C1887: CPT

## 2023-04-22 RX ADMIN — SODIUM CHLORIDE 210 MILLILITER(S): 9 INJECTION INTRAMUSCULAR; INTRAVENOUS; SUBCUTANEOUS at 00:13

## 2023-04-22 RX ADMIN — Medication 81 MILLIGRAM(S): at 12:05

## 2023-04-22 RX ADMIN — CLOPIDOGREL BISULFATE 75 MILLIGRAM(S): 75 TABLET, FILM COATED ORAL at 12:05

## 2023-04-22 RX ADMIN — LOSARTAN POTASSIUM 100 MILLIGRAM(S): 100 TABLET, FILM COATED ORAL at 05:20

## 2023-04-22 RX ADMIN — FINASTERIDE 5 MILLIGRAM(S): 5 TABLET, FILM COATED ORAL at 12:05

## 2023-04-22 RX ADMIN — Medication 2: at 12:12

## 2023-04-22 NOTE — DISCHARGE NOTE NURSING/CASE MANAGEMENT/SOCIAL WORK - PATIENT PORTAL LINK FT
You can access the FollowMyHealth Patient Portal offered by [facility] by registering at the following website: [email or web address]. By joining [email or web address] FollowMyHealth portal, you will also be able to view your health information using other applications (apps) compatible with our system.

## 2023-04-22 NOTE — DISCHARGE NOTE PROVIDER - HOSPITAL COURSE
#Discharge: do not delete    83 year old Turkmen/English speaking M with PMHx HTN, HLD, DM Type II, and AI, presented to Dr Oscar Cohen with c/o worsening CP and SOB per Dr. Cohen's note.  Patient also recently had a syncopal episode - while on a ladder, patient reportedly lost consciousness and fell about 5 feet, striking his head. He refused to go to ER at that time. Patient recently underwent CCTA (4/10/23) revealing mild non-obstructive CAD , however, extensive calcification and associated artifact limiting evaluation of proximal and mid left anterior descending artery, with calcium score of 1041 corresponding to 90%. incidental finding of indeterminate 3 mm LLL pulmonary nodule. Patient also underwent TTE (3/21/23) revealing normal global wall motion with EF 57%, mild AR.   Inpatient treatment course: Cardiac cath s/p CSI arthrectomy, 80% calcified pLAD s/p RUBEN x2, no AS, EF 57%  Patient was discharged to: Home  New medications: None  Changes to old medications: None  Medications that were stopped: None  Items to Follow up as outpatient: FU with your cardiologist and primary care physician  Physical exam at time of discharge:   .  VITAL SIGNS:  T(C): 36.3 (04-22-23 @ 10:35), Max: 36.6 (04-21-23 @ 22:11)  T(F): 97.4 (04-22-23 @ 10:35), Max: 97.9 (04-21-23 @ 22:11)  HR: 51 (04-22-23 @ 05:17) (51 - 64)  BP: 125/59 (04-22-23 @ 05:17) (125/59 - 142/68)  BP(mean): --  RR: 17 (04-22-23 @ 05:17) (16 - 18)  SpO2: 97% (04-22-23 @ 05:17) (95% - 98%)  Wt(kg): --    PHYSICAL EXAM:    Constitutional: resting comfortably in bed; NAD  Head: NC/AT  Eyes: PERRL, EOMI, anicteric sclera  ENT: no nasal discharge; uvula midline, no oropharyngeal erythema or exudates; MMM  Neck: supple; no JVD or thyromegaly  Respiratory: CTA B/L; no W/R/R, no retractions  Cardiac: +S1/S2; RRR; no M/R/G;   Gastrointestinal: abdomen soft, NT/ND; no rebound or guarding; +BSx4  Back: spine midline, no bony tenderness or step-offs; no CVAT B/L  Extremities: WWP, no clubbing or cyanosis; no peripheral edema  Musculoskeletal: NROM x4; no joint swelling, tenderness or erythema  Vascular: 2+ radial, DP pulses B/L, right radial site - no tenderness or hematoma   Dermatologic: skin warm, dry and intact; no rashes, wounds, or scars  Lymphatic: no submandibular or cervical LAD  Neurologic: AAOx3; CNII-XII grossly intact; no focal deficits  Psychiatric: affect and characteristics of appearance, verbalizations, behaviors are appropriate #Discharge: do not delete    83 year old Maltese/English speaking M with PMHx HTN, HLD, DM Type II, and AI, presented to Dr Oscar Cohen with c/o worsening CP and SOB per Dr. Cohen's note.  Patient also recently had a syncopal episode - while on a ladder, patient reportedly lost consciousness and fell about 5 feet, striking his head. He refused to go to ER at that time. Patient recently underwent CCTA (4/10/23) revealing mild non-obstructive CAD , however, extensive calcification and associated artifact limiting evaluation of proximal and mid left anterior descending artery, with calcium score of 1041 corresponding to 90%. incidental finding of indeterminate 3 mm LLL pulmonary nodule. Patient also underwent TTE (3/21/23) revealing normal global wall motion with EF 57%, mild AR.   Inpatient treatment course: Cardiac cath s/p CSI arthrectomy, 80% calcified pLAD s/p RUBEN x2, no AS, EF 57%  Patient was discharged to: Home  New medications: Plavix   Changes to old medications: None  Medications that were stopped: None  Items to Follow up as outpatient: FU with your cardiologist and primary care physician  Physical exam at time of discharge:   .  VITAL SIGNS:  T(C): 36.3 (04-22-23 @ 10:35), Max: 36.6 (04-21-23 @ 22:11)  T(F): 97.4 (04-22-23 @ 10:35), Max: 97.9 (04-21-23 @ 22:11)  HR: 51 (04-22-23 @ 05:17) (51 - 64)  BP: 125/59 (04-22-23 @ 05:17) (125/59 - 142/68)  BP(mean): --  RR: 17 (04-22-23 @ 05:17) (16 - 18)  SpO2: 97% (04-22-23 @ 05:17) (95% - 98%)  Wt(kg): --    PHYSICAL EXAM:    Constitutional: resting comfortably in bed; NAD  Head: NC/AT  Eyes: PERRL, EOMI, anicteric sclera  ENT: no nasal discharge; uvula midline, no oropharyngeal erythema or exudates; MMM  Neck: supple; no JVD or thyromegaly  Respiratory: CTA B/L; no W/R/R, no retractions  Cardiac: +S1/S2; RRR; no M/R/G;   Gastrointestinal: abdomen soft, NT/ND; no rebound or guarding; +BSx4  Back: spine midline, no bony tenderness or step-offs; no CVAT B/L  Extremities: WWP, no clubbing or cyanosis; no peripheral edema  Musculoskeletal: NROM x4; no joint swelling, tenderness or erythema  Vascular: 2+ radial, DP pulses B/L, right radial site - no tenderness or hematoma   Dermatologic: skin warm, dry and intact; no rashes, wounds, or scars  Lymphatic: no submandibular or cervical LAD  Neurologic: AAOx3; CNII-XII grossly intact; no focal deficits  Psychiatric: affect and characteristics of appearance, verbalizations, behaviors are appropriate #Discharge: do not delete    83 year old Sami/English speaking M with PMHx HTN, HLD, DM Type II, and AI, presented to Dr Oscar Cohen with c/o worsening CP and SOB per Dr. Cohen's note.  Patient also recently had a syncopal episode - while on a ladder, patient reportedly lost consciousness and fell about 5 feet, striking his head. He refused to go to ER at that time. Patient recently underwent CCTA (4/10/23) revealing mild non-obstructive CAD , however, extensive calcification and associated artifact limiting evaluation of proximal and mid left anterior descending artery, with calcium score of 1041 corresponding to 90%. incidental finding of indeterminate 3 mm LLL pulmonary nodule. Patient also underwent TTE (3/21/23) revealing normal global wall motion with EF 57%, mild AR.   Inpatient treatment course: Cardiac cath s/p CSI arthrectomy, 80% calcified pLAD s/p RUBEN x2, no AS, EF 57%  Patient was discharged to: Home  New medications: Plavix   Changes to old medications: None. Recommend increasing to high intensity statin  Medications that were stopped: None  Items to Follow up as outpatient: FU with your cardiologist and primary care physician  Physical exam at time of discharge:   .  VITAL SIGNS:  T(C): 36.3 (04-22-23 @ 10:35), Max: 36.6 (04-21-23 @ 22:11)  T(F): 97.4 (04-22-23 @ 10:35), Max: 97.9 (04-21-23 @ 22:11)  HR: 51 (04-22-23 @ 05:17) (51 - 64)  BP: 125/59 (04-22-23 @ 05:17) (125/59 - 142/68)  BP(mean): --  RR: 17 (04-22-23 @ 05:17) (16 - 18)  SpO2: 97% (04-22-23 @ 05:17) (95% - 98%)  Wt(kg): --    PHYSICAL EXAM:    Constitutional: resting comfortably in bed; NAD  Head: NC/AT  Eyes: PERRL, EOMI, anicteric sclera  ENT: no nasal discharge; uvula midline, no oropharyngeal erythema or exudates; MMM  Neck: supple; no JVD or thyromegaly  Respiratory: CTA B/L; no W/R/R, no retractions  Cardiac: +S1/S2; RRR; no M/R/G;   Gastrointestinal: abdomen soft, NT/ND; no rebound or guarding; +BSx4  Back: spine midline, no bony tenderness or step-offs; no CVAT B/L  Extremities: WWP, no clubbing or cyanosis; no peripheral edema  Musculoskeletal: NROM x4; no joint swelling, tenderness or erythema  Vascular: 2+ radial, DP pulses B/L, right radial site - no tenderness or hematoma   Dermatologic: skin warm, dry and intact; no rashes, wounds, or scars  Lymphatic: no submandibular or cervical LAD  Neurologic: AAOx3; CNII-XII grossly intact; no focal deficits  Psychiatric: affect and characteristics of appearance, verbalizations, behaviors are appropriate

## 2023-04-22 NOTE — DISCHARGE NOTE PROVIDER - NSDCMRMEDTOKEN_GEN_ALL_CORE_FT
aspirin 81 mg oral delayed release tablet: 1 tab(s) orally once a day  atorvastatin 10 mg oral tablet: 1 tab(s) orally once a day (at bedtime)  finasteride 5 mg oral tablet: 1 tab(s) orally once a day  losartan-hydroCHLOROthiazide 100 mg-25 mg oral tablet: 1 tab(s) orally once a day  metFORMIN 850 mg oral tablet: 1 tab(s) orally 2 times a day  tamsulosin 0.4 mg oral capsule: 1 cap(s) orally once a day   aspirin 81 mg oral delayed release tablet: 1 tab(s) orally once a day  atorvastatin 10 mg oral tablet: 1 tab(s) orally once a day (at bedtime)  clopidogrel 75 mg oral tablet: 1 tab(s) orally once a day  finasteride 5 mg oral tablet: 1 tab(s) orally once a day  losartan-hydroCHLOROthiazide 100 mg-25 mg oral tablet: 1 tab(s) orally once a day  metFORMIN 850 mg oral tablet: 1 tab(s) orally 2 times a day  tamsulosin 0.4 mg oral capsule: 1 cap(s) orally once a day

## 2023-04-22 NOTE — DISCHARGE NOTE PROVIDER - NSDCCPCAREPLAN_GEN_ALL_CORE_FT
PRINCIPAL DISCHARGE DIAGNOSIS  Diagnosis: CAD (coronary artery disease)  Assessment and Plan of Treatment: CAD (coronary artery disease)  Assessment and Plan of Treatment: Diagnosis: CAD (coronary artery disease)  Assessment and Plan of Treatment: - You came to the hospital for a planned cardiac cath. You have a known history of coronary artery disease in which there is damage caused by a lack of blood flow through the coronary arteries. The arteries become narrowed by fatty deposits called plaque which limit the blood flow to the heart for which you had stents placed in the past to open the arteries and increase the blood flow.   - You underwent a cardiac cath and received 2 STENTS to your left anterior descending artery to increase the blood flow to the heart.  Cath Report for your cardiologist: s/p CSI artherectomy, 80% calcified pLAD s/p RUBEN x2, no AS. LM normal, LCx mild luminal irregularitiesm RCA mild luminal irregularities (Large dominant), EDP 8, EF 57%  For treatment:  - NEVER MISS A DOSE OF ASPIRIN OR PLAVIX. IF YOU DO, YOU ARE AT RISK OF YOUR STENTS CLOSING AND HAVING A HEART ATTACK. DO NOT STOP THESE TWO MEDICATIONS UNLESS INSTRUCTED TO DO SO BY YOUR CARDIOLOGIST.   - Do NOT drive or operate hazardous machinery for 24 hours. Limit your physical activity for 24-48 hours. Do NOT engage in sports, heavy work or heavy lifting for 72 hours.   - You MAY shower BUT no TUB BATHS, HOT TUBS OR SWIMMING FOR 5 DAYS  - Your procedure was done through your right wrist. If you observe flank bleeding from the puncture site, it is an emergency. Please put direct pressure on the site and go directly to the ER. Bleeding under the skin may also occur and a small "black and blue" may be expected. If the area appears to be expanding or swelling around the puncture site, apply manual compression and go immediately to the nearest ER. If your arm/hand becomes cool or blue and/or you are unable to move it, this must be treated as an emergency, go directly to the nearest ER. Look for signs of infection in the wrist: fever, red streaking of the arm, obvious pus formation and pain.        SECONDARY DISCHARGE DIAGNOSES  Diagnosis: DM (diabetes mellitus)  Assessment and Plan of Treatment: You have a known history of diabetes mellitus prior to your admission. This condition results from blood sugar levels getting too high because your body is more resistant to insulin. Uncontrolled blood sugar levels can lead to kidney and heart damage, pain/numbness/paralysis in your hands and feet, and increased rates of infections.  To manage this you are on a medication called metformin. It is important that you continue to take this medication when you are discharged so that you can continue to control your blood sugar levels. Additionally be sure to follow up with your primary care physician, podiatrist, and ophthalmologist on a regular basis.      Diagnosis: HTN (hypertension)  Assessment and Plan of Treatment: Hypertension is the medical term for high blood pressure. Blood pressure refers to the pressure that blood applies to the inner walls of the arteries. Arteries carry blood from the heart to other organs and parts of the body. Untreated high blood pressure increases the strain on the heart and arteries, eventually causing organ damage. High blood pressure increases the risk of heart failure, heart attack (myocardial infarction), stroke, and kidney failure. High blood pressure does not usually cause any symptoms. Treatment of hypertension usually begins with lifestyle changes. Making these lifestyle changes involves little or no risk. Recommended changes often include reducing the amount of salt in your diet, losing weight if you are overweight or obese, avoiding drinking too much alcohol, stopping smoking and exercising at least 30 minutes per day most days of the week. If you are prescribed medication for your hypertension it is important to take these as prescribed to prevent the possible complications of uncontrolled hypertension.       PRINCIPAL DISCHARGE DIAGNOSIS  Diagnosis: CAD (coronary artery disease)  Assessment and Plan of Treatment: CAD (coronary artery disease)  Assessment and Plan of Treatment: Diagnosis: CAD (coronary artery disease)  Assessment and Plan of Treatment: - You came to the hospital for a planned cardiac cath. You have a known history of coronary artery disease in which there is damage caused by a lack of blood flow through the coronary arteries. The arteries become narrowed by fatty deposits called plaque which limit the blood flow to the heart for which you had stents placed in the past to open the arteries and increase the blood flow.   - You underwent a cardiac cath and received 2 STENTS to your left anterior descending artery to increase the blood flow to the heart.  Cath Report for your cardiologist: s/p CSI artherectomy, 80% calcified pLAD s/p RUBEN x2, no AS. LM normal, LCx mild luminal irregularitiesm RCA mild luminal irregularities (Large dominant), EDP 8, EF 57%  For treatment:  - NEVER MISS A DOSE OF ASPIRIN OR PLAVIX. IF YOU DO, YOU ARE AT RISK OF YOUR STENTS CLOSING AND HAVING A HEART ATTACK. DO NOT STOP THESE TWO MEDICATIONS UNLESS INSTRUCTED TO DO SO BY YOUR CARDIOLOGIST.   - You last dose of Plavix was at noon on 4/22/2023 a prescription was sent to [facility]. Please start this medication on 04/23/2023.   - Do NOT drive or operate hazardous machinery for 24 hours. Limit your physical activity for 24-48 hours. Do NOT engage in sports, heavy work or heavy lifting for 72 hours.   - You MAY shower BUT no TUB BATHS, HOT TUBS OR SWIMMING FOR 5 DAYS  - Your procedure was done through your groin. If you observe flank bleeding from the puncture site, it is an emergency. Please put direct pressure on the site and go directly to the ER. If the area appears to be expanding or swelling around the puncture site, apply manual compression and go immediately to the nearest ER.         SECONDARY DISCHARGE DIAGNOSES  Diagnosis: DM (diabetes mellitus)  Assessment and Plan of Treatment: You have a known history of diabetes mellitus prior to your admission. This condition results from blood sugar levels getting too high because your body is more resistant to insulin. Uncontrolled blood sugar levels can lead to kidney and heart damage, pain/numbness/paralysis in your hands and feet, and increased rates of infections.  To manage this you are on a medication called metformin. It is important that you continue to take this medication when you are discharged so that you can continue to control your blood sugar levels. Additionally be sure to follow up with your primary care physician, podiatrist, and ophthalmologist on a regular basis.      Diagnosis: HTN (hypertension)  Assessment and Plan of Treatment: Hypertension is the medical term for high blood pressure. Blood pressure refers to the pressure that blood applies to the inner walls of the arteries. Arteries carry blood from the heart to other organs and parts of the body. Untreated high blood pressure increases the strain on the heart and arteries, eventually causing organ damage. High blood pressure increases the risk of heart failure, heart attack (myocardial infarction), stroke, and kidney failure. High blood pressure does not usually cause any symptoms. Treatment of hypertension usually begins with lifestyle changes. Making these lifestyle changes involves little or no risk. Recommended changes often include reducing the amount of salt in your diet, losing weight if you are overweight or obese, avoiding drinking too much alcohol, stopping smoking and exercising at least 30 minutes per day most days of the week. If you are prescribed medication for your hypertension it is important to take these as prescribed to prevent the possible complications of uncontrolled hypertension.      Diagnosis: Hyperlipemia  Assessment and Plan of Treatment: You have high cholesterol. Cholesterol is a substance that is found in the blood. Everyone has some. It is needed for good health. The problem is, people sometimes have too much cholesterol. Compared with people with normal cholesterol, people with high cholesterol have a higher risk of heart attacks, strokes, and other health problems. The higher your cholesterol, the higher your risk of these problems. It is important to take your medications for high cholesterol as prescribed to prevent the complications of this condition.   You are currently taking Atorvastatin 10mg at bedtime. It is recommended to increase this medication to at least 40mg at bedtime. Please discuss with your outpatient cardiologist Dr. Cohen.

## 2023-04-22 NOTE — DISCHARGE NOTE PROVIDER - CARE PROVIDER_API CALL
Oscar Cohen)  Cardiovascular Disease; Internal Medicine  [address] [place] [address]  Phone: [phone]  Fax: [phone]  Follow Up Time: 1 week

## 2023-04-23 RX ORDER — CLOPIDOGREL BISULFATE 75 MG/1
1 TABLET, FILM COATED ORAL
Qty: 90 | Refills: 1
Start: 2023-04-23

## 2023-04-26 DIAGNOSIS — Z79.82 LONG TERM (CURRENT) USE OF ASPIRIN: ICD-10-CM

## 2023-04-26 DIAGNOSIS — I25.110 ATHEROSCLEROTIC HEART DISEASE OF NATIVE CORONARY ARTERY WITH UNSTABLE ANGINA PECTORIS: ICD-10-CM

## 2023-04-26 DIAGNOSIS — Z79.84 LONG TERM (CURRENT) USE OF ORAL HYPOGLYCEMIC DRUGS: ICD-10-CM

## 2023-04-26 DIAGNOSIS — R55 SYNCOPE AND COLLAPSE: ICD-10-CM

## 2023-04-26 DIAGNOSIS — N40.0 BENIGN PROSTATIC HYPERPLASIA WITHOUT LOWER URINARY TRACT SYMPTOMS: ICD-10-CM

## 2023-04-26 DIAGNOSIS — I25.84 CORONARY ATHEROSCLEROSIS DUE TO CALCIFIED CORONARY LESION: ICD-10-CM

## 2023-04-26 DIAGNOSIS — E78.5 HYPERLIPIDEMIA, UNSPECIFIED: ICD-10-CM

## 2023-04-26 DIAGNOSIS — I10 ESSENTIAL (PRIMARY) HYPERTENSION: ICD-10-CM

## 2023-04-26 DIAGNOSIS — R91.1 SOLITARY PULMONARY NODULE: ICD-10-CM

## 2023-04-26 DIAGNOSIS — E11.9 TYPE 2 DIABETES MELLITUS WITHOUT COMPLICATIONS: ICD-10-CM

## 2023-05-02 LAB — ISTAT ACTK (ACTIVATED CLOTTING TIME KAOLIN): 323 SEC — HIGH (ref 74–137)

## 2023-07-14 ENCOUNTER — NON-APPOINTMENT (OUTPATIENT)
Age: 83
End: 2023-07-14

## 2023-07-14 ENCOUNTER — APPOINTMENT (OUTPATIENT)
Dept: UROLOGY | Facility: CLINIC | Age: 83
End: 2023-07-14
Payer: MEDICARE

## 2023-07-14 VITALS
DIASTOLIC BLOOD PRESSURE: 66 MMHG | BODY MASS INDEX: 24.33 KG/M2 | HEIGHT: 67 IN | WEIGHT: 155 LBS | HEART RATE: 70 BPM | TEMPERATURE: 98.2 F | SYSTOLIC BLOOD PRESSURE: 125 MMHG | OXYGEN SATURATION: 95 %

## 2023-07-14 PROCEDURE — 99214 OFFICE O/P EST MOD 30 MIN: CPT

## 2023-07-14 NOTE — HISTORY OF PRESENT ILLNESS
[FreeTextEntry1] : 76 yo Yakut speaking M with longstanding history of BPH for which he has been on tamsulosin 0.8mg and finasteride. Patient states that his LUTS has been relatively stable. Does continue to have some weak stream and nocturia (1-2/night) but not very bothersome. Denies any gross hematuria, dysuria, history of UTI, nephrolithiasis. \par \par 2/10/21 Interval history: Still taking tamsulosin 0.4mg\par Pt state LUTS has not been bothersome lately with OK flow and no straining\par Recently had labwork done by PCP on 1/26 and was told so rise in Cr\par PSA was 2.28 and normal UA\par Pending endocrine workup for his DM2\par \par 8/6/21 Interval history PCP stopped finasteride and reduced tamsulosin\par Some slight weakening of stream but no huge effect on LUTS \par Still changing around meds for DM2\par ED issues - erections not adequate for penetration\par In the past, was prescribed meds but never got them due to pricing.\par s/p inguinal hernia repair in the interim\par \par 2/11/22 Interval history: Doing well since last visit\par Still with some nocturia but not bothersome on tamsulosin\par PCP labwork 1/21 - normal Cr, a1c 6.8, normal UA\par \par 8/12/22 Interval history: Doing well\par on dual medical therapy\par no nocturia if restricting fluids in the evening\par \par 2/17/23 Interval history: some weak flow but no nocturia\par Doing well on dual medical therapy\par \par 7/14/23: Gross hematuria starting last night. He had a drug eluting stent placed in April 2023 and on ASA/plavix. PVR: 32mL. States he will intermittently have clear urine and hten will have hematuria at the end of his void with some blood leakage. Denies any dysuria. \par \par \par \par

## 2023-07-14 NOTE — PHYSICAL EXAM
[General Appearance - Well Developed] : well developed [General Appearance - Well Nourished] : well nourished [Normal Appearance] : normal appearance [Well Groomed] : well groomed [General Appearance - In No Acute Distress] : no acute distress [Abdomen Soft] : soft [Abdomen Tenderness] : non-tender [Costovertebral Angle Tenderness] : no ~M costovertebral angle tenderness [Urethral Meatus] : meatus normal [Penis Abnormality] : normal circumcised penis [Urinary Bladder Findings] : the bladder was normal on palpation [Scrotum] : the scrotum was normal [Rectal Exam - Seminal Vesicles] : the seminal vesicles were normal [Epididymis] : the epididymides were normal [Testes Tenderness] : no tenderness of the testes [Testes Mass (___cm)] : there were no testicular masses [Anus Abnormality] : the anus and perineum were normal [Rectal Exam - Rectum] : digital rectal exam was normal [Edema] : no peripheral edema [] : no respiratory distress [Respiration, Rhythm And Depth] : normal respiratory rhythm and effort [Exaggerated Use Of Accessory Muscles For Inspiration] : no accessory muscle use [Oriented To Time, Place, And Person] : oriented to person, place, and time [Affect] : the affect was normal [Mood] : the mood was normal [Not Anxious] : not anxious [Normal Station and Gait] : the gait and station were normal for the patient's age [No Focal Deficits] : no focal deficits [Motor Exam] : the motor exam was normal [No Palpable Adenopathy] : no palpable adenopathy

## 2023-07-14 NOTE — ASSESSMENT
[FreeTextEntry1] : 81 yo M with stable chronic BPH.  He had a cardiac stent placed in April 2023 and has been on aspirin Plavix since then.  He developed gross hematuria for the first time yesterday and states that it will intermittently clear up but then have gross hematuria at the end of his voids and some bloody leakage in between voiding.  His PVR today was 32 mL demonstrating the patient is emptying his bladder.  He will continue his tamsulosin and finasteride and undergo gross hematuria work-up.  He was encouraged to push fluids to dilute the urine and help clear the hematuria.  He will undergo CT urogram urine studies and a cystoscopy next week.\par \par Plan\par - PVR =32ml demonstrating patient able to empty bladder\par - COntinue dual medical therapy\par - UA\par - Urine culture\par - Urine cytology\par - CT urogram\par - Cystoscopy next week

## 2023-07-17 LAB
APPEARANCE: ABNORMAL
BACTERIA: ABNORMAL /HPF
BILIRUBIN URINE: ABNORMAL
BLOOD URINE: ABNORMAL
CAST: 0 /LPF
COLOR: ABNORMAL
EPITHELIAL CELLS: 3 /HPF
GLUCOSE QUALITATIVE U: NEGATIVE MG/DL
KETONES URINE: NEGATIVE MG/DL
LEUKOCYTE ESTERASE URINE: ABNORMAL
MICROSCOPIC-UA: NORMAL
NITRITE URINE: POSITIVE
PH URINE: 6
PROTEIN URINE: 100 MG/DL
RED BLOOD CELLS URINE: >1900 /HPF
REVIEW: NORMAL
SPECIFIC GRAVITY URINE: 1.01
UROBILINOGEN URINE: 0.2 MG/DL
WHITE BLOOD CELLS URINE: 44 /HPF

## 2023-07-18 LAB — URINE CYTOLOGY: NORMAL

## 2023-07-19 ENCOUNTER — APPOINTMENT (OUTPATIENT)
Dept: UROLOGY | Facility: CLINIC | Age: 83
End: 2023-07-19
Payer: MEDICARE

## 2023-07-19 ENCOUNTER — APPOINTMENT (OUTPATIENT)
Dept: UROLOGY | Facility: CLINIC | Age: 83
End: 2023-07-19

## 2023-07-19 VITALS
HEIGHT: 67 IN | SYSTOLIC BLOOD PRESSURE: 116 MMHG | TEMPERATURE: 98.1 F | WEIGHT: 155 LBS | DIASTOLIC BLOOD PRESSURE: 65 MMHG | OXYGEN SATURATION: 96 % | BODY MASS INDEX: 24.33 KG/M2 | HEART RATE: 61 BPM

## 2023-07-19 DIAGNOSIS — K83.8 OTHER SPECIFIED DISEASES OF BILIARY TRACT: ICD-10-CM

## 2023-07-19 LAB — BACTERIA UR CULT: ABNORMAL

## 2023-07-19 PROCEDURE — 99213 OFFICE O/P EST LOW 20 MIN: CPT | Mod: 25

## 2023-07-19 PROCEDURE — 52000 CYSTOURETHROSCOPY: CPT

## 2023-07-19 NOTE — HISTORY OF PRESENT ILLNESS
[FreeTextEntry1] : Pleasant 83-year-old male seen by Dr. Castrejon for his BPH.  He has been doing well while on tamsulosin 0.8 mg and finasteride 5 mg daily.  He had a drug-eluting stent placed April 2023 and was on dual antiplatelet therapy.  He developed gross hematuria 1 week ago.  He is here today for cystoscopy and to discuss the results of his CT urogram.\par \par Of note the patient was discussing with his cardiologist and he has since stopped his Plavix and his urine has cleared

## 2023-07-19 NOTE — PHYSICAL EXAM
[Normal Appearance] : normal appearance [Abdomen Tenderness] : non-tender [Testes Tenderness] : no tenderness of the testes [Edema] : no peripheral edema [] : no respiratory distress [Not Anxious] : not anxious [Normal Station and Gait] : the gait and station were normal for the patient's age [No Focal Deficits] : no focal deficits [No Palpable Adenopathy] : no palpable adenopathy

## 2023-07-19 NOTE — ASSESSMENT
[FreeTextEntry1] : Pleasant 83-year-old male with history of BPH.  He is doing well on tamsulosin 0.8 mg and finasteride 5 mg daily.  He developed gross hematuria.  His cystoscopy done today was negative.  I have reviewed the images of his CT urogram which did show a 2 and half centimeter right renal mass.  However, the final radiology report has not resulted.  I will call his son once the report is finalized and determine if he will need imaging in 6 months to assess for growth kinetics prior to his follow-up appoint with Dr. Castrejon\par \par Son is Raz Regalado: [phone]\par \par Plan\par I have reviewed the images of the patient's CT urogram from July 17, 2023 and discussed the results with the patient and his wife which demonstrates a 2-1/2 cm right renal mass\par Finalized results pending we will call the patient's son and discuss his results\par Continue tamsulosin and finasteride\par Cystoscopy done today did not demonstrate any bladder masses or stones\par Follow-up in 6 months with Dr. Castrejon

## 2023-07-20 PROBLEM — K83.8 DILATION OF BILIARY TRACT: Status: ACTIVE | Noted: 2023-07-20

## 2023-09-22 ENCOUNTER — APPOINTMENT (OUTPATIENT)
Age: 83
End: 2023-09-22

## 2023-09-22 ENCOUNTER — APPOINTMENT (OUTPATIENT)
Age: 83
End: 2023-09-22
Payer: MEDICARE

## 2023-09-22 VITALS
HEART RATE: 56 BPM | BODY MASS INDEX: 24.28 KG/M2 | SYSTOLIC BLOOD PRESSURE: 130 MMHG | WEIGHT: 155 LBS | OXYGEN SATURATION: 95 % | TEMPERATURE: 98 F | DIASTOLIC BLOOD PRESSURE: 70 MMHG

## 2023-09-22 PROCEDURE — 99214 OFFICE O/P EST MOD 30 MIN: CPT

## 2023-11-05 ENCOUNTER — RX RENEWAL (OUTPATIENT)
Age: 83
End: 2023-11-05

## 2023-12-18 ENCOUNTER — APPOINTMENT (OUTPATIENT)
Age: 83
End: 2023-12-18
Payer: MEDICARE

## 2023-12-18 VITALS
BODY MASS INDEX: 24.33 KG/M2 | SYSTOLIC BLOOD PRESSURE: 124 MMHG | OXYGEN SATURATION: 97 % | HEART RATE: 87 BPM | HEIGHT: 67 IN | DIASTOLIC BLOOD PRESSURE: 71 MMHG | WEIGHT: 155 LBS | TEMPERATURE: 98 F

## 2023-12-18 PROCEDURE — 99214 OFFICE O/P EST MOD 30 MIN: CPT

## 2023-12-18 NOTE — PHYSICAL EXAM
[Normal Appearance] : normal appearance [Well Groomed] : well groomed [General Appearance - In No Acute Distress] : no acute distress [Edema] : no peripheral edema [Respiration, Rhythm And Depth] : normal respiratory rhythm and effort [Exaggerated Use Of Accessory Muscles For Inspiration] : no accessory muscle use [Abdomen Soft] : soft [Abdomen Tenderness] : non-tender [Costovertebral Angle Tenderness] : no ~M costovertebral angle tenderness [Normal Station and Gait] : the gait and station were normal for the patient's age [] : no rash [No Focal Deficits] : no focal deficits [Oriented To Time, Place, And Person] : oriented to person, place, and time [Affect] : the affect was normal [Mood] : the mood was normal [No Palpable Adenopathy] : no palpable adenopathy

## 2023-12-18 NOTE — ASSESSMENT
[FreeTextEntry1] : 84 yo M with small right renal mass  - Reviewed CT imaging from 12/11/23 through [facility] portal and confirmed findings as stated above - Again reviewed pros and cons of active surveillance vs perc ablation vs surgical extirpation. Discussed risks and benefits of radical vs partial nephrectomy, open vs minimally invasive. - Decision made to proceed with robotic assisted laparoscopic right partial nephrectomy, possible radical, possible open. Will schedule in the near future - Medical/cards clearance

## 2023-12-18 NOTE — HISTORY OF PRESENT ILLNESS
[FreeTextEntry1] : 76 yo Luxembourgish speaking M with longstanding history of BPH for which he has been on tamsulosin 0.8mg and finasteride. Patient states that his LUTS has been relatively stable. Does continue to have some weak stream and nocturia (1-2/night) but not very bothersome. Denies any gross hematuria, dysuria, history of UTI, nephrolithiasis.   2/10/21 Interval history: Still taking tamsulosin 0.4mg Pt state LUTS has not been bothersome lately with OK flow and no straining Recently had labwork done by PCP on 1/26 and was told so rise in Cr PSA was 2.28 and normal UA Pending endocrine workup for his DM2  8/6/21 Interval history PCP stopped finasteride and reduced tamsulosin Some slight weakening of stream but no huge effect on LUTS  Still changing around meds for DM2 ED issues - erections not adequate for penetration In the past, was prescribed meds but never got them due to pricing. s/p inguinal hernia repair in the interim  2/11/22 Interval history: Doing well since last visit Still with some nocturia but not bothersome on tamsulosin PCP labwork 1/21 - normal Cr, a1c 6.8, normal UA  8/12/22 Interval history: Doing well on dual medical therapy no nocturia if restricting fluids in the evening  2/17/23 Interval history: some weak flow but no nocturia Doing well on dual medical therapy  9/22/23 Interval history: Had some gross hematuria in July Evaluated by Dr. Giang Normal cysto but CT was significant for a renal mass measuring 2.6cm in right lower pole s/p drug eluting stents in April and currently on blood thinners  12/18/23 Interval history: Had repeat CT which showed stable right lower pole renal mass Denies any flank pain or gross hematuria

## 2023-12-20 LAB
APPEARANCE: CLEAR
BACTERIA UR CULT: NORMAL
BACTERIA: NEGATIVE /HPF
BILIRUBIN URINE: NEGATIVE
BLOOD URINE: NEGATIVE
CAST: 1 /LPF
COLOR: NORMAL
EPITHELIAL CELLS: 0 /HPF
GLUCOSE QUALITATIVE U: 500 MG/DL
KETONES URINE: NEGATIVE MG/DL
LEUKOCYTE ESTERASE URINE: NEGATIVE
MICROSCOPIC-UA: NORMAL
NITRITE URINE: NEGATIVE
PH URINE: 6
PROTEIN URINE: 30 MG/DL
RED BLOOD CELLS URINE: 0 /HPF
SPECIFIC GRAVITY URINE: 1.02
UROBILINOGEN URINE: 1 MG/DL
WHITE BLOOD CELLS URINE: 1 /HPF

## 2024-01-22 ENCOUNTER — APPOINTMENT (OUTPATIENT)
Dept: UROLOGY | Facility: CLINIC | Age: 84
End: 2024-01-22
Payer: MEDICARE

## 2024-01-22 VITALS
DIASTOLIC BLOOD PRESSURE: 62 MMHG | RESPIRATION RATE: 15 BRPM | WEIGHT: 155 LBS | HEART RATE: 62 BPM | TEMPERATURE: 97.1 F | SYSTOLIC BLOOD PRESSURE: 110 MMHG | BODY MASS INDEX: 24.33 KG/M2 | OXYGEN SATURATION: 97 % | HEIGHT: 67 IN

## 2024-01-22 DIAGNOSIS — Z87.898 PERSONAL HISTORY OF OTHER SPECIFIED CONDITIONS: ICD-10-CM

## 2024-01-22 PROCEDURE — 99214 OFFICE O/P EST MOD 30 MIN: CPT

## 2024-01-22 NOTE — ASSESSMENT
[FreeTextEntry1] : 84 yo M with renal mass  - Again reviewed all risks, benefits and alternatives to surgical extirpation of renal mass and addressed all concerns - Pt scheduled for surgery in February and eager to proceed

## 2024-01-22 NOTE — HISTORY OF PRESENT ILLNESS
[FreeTextEntry1] : 76 yo Italian speaking M with longstanding history of BPH for which he has been on tamsulosin 0.8mg and finasteride. Patient states that his LUTS has been relatively stable. Does continue to have some weak stream and nocturia (1-2/night) but not very bothersome. Denies any gross hematuria, dysuria, history of UTI, nephrolithiasis.   2/10/21 Interval history: Still taking tamsulosin 0.4mg Pt state LUTS has not been bothersome lately with OK flow and no straining Recently had labwork done by PCP on 1/26 and was told so rise in Cr PSA was 2.28 and normal UA Pending endocrine workup for his DM2  8/6/21 Interval history PCP stopped finasteride and reduced tamsulosin Some slight weakening of stream but no huge effect on LUTS  Still changing around meds for DM2 ED issues - erections not adequate for penetration In the past, was prescribed meds but never got them due to pricing. s/p inguinal hernia repair in the interim  2/11/22 Interval history: Doing well since last visit Still with some nocturia but not bothersome on tamsulosin PCP labwork 1/21 - normal Cr, a1c 6.8, normal UA  8/12/22 Interval history: Doing well on dual medical therapy no nocturia if restricting fluids in the evening  2/17/23 Interval history: some weak flow but no nocturia Doing well on dual medical therapy  9/22/23 Interval history: Had some gross hematuria in July Evaluated by Dr. Giang Normal cysto but CT was significant for a renal mass measuring 2.6cm in right lower pole s/p drug eluting stents in April and currently on blood thinners  12/18/23 Interval history: Had repeat CT which showed stable right lower pole renal mass Denies any flank pain or gross hematuria  1/22/24 Interval history: Doing well since last visit Here with some questions regarding his pending partial nephrectomy

## 2024-01-31 ENCOUNTER — OUTPATIENT (OUTPATIENT)
Dept: OUTPATIENT SERVICES | Facility: HOSPITAL | Age: 84
LOS: 1 days | End: 2024-01-31

## 2024-01-31 VITALS
HEIGHT: 66.5 IN | WEIGHT: 154.98 LBS | DIASTOLIC BLOOD PRESSURE: 68 MMHG | OXYGEN SATURATION: 96 % | HEART RATE: 73 BPM | TEMPERATURE: 97 F | RESPIRATION RATE: 16 BRPM | SYSTOLIC BLOOD PRESSURE: 123 MMHG

## 2024-01-31 DIAGNOSIS — E11.9 TYPE 2 DIABETES MELLITUS WITHOUT COMPLICATIONS: ICD-10-CM

## 2024-01-31 DIAGNOSIS — Z98.890 OTHER SPECIFIED POSTPROCEDURAL STATES: Chronic | ICD-10-CM

## 2024-01-31 DIAGNOSIS — N28.89 OTHER SPECIFIED DISORDERS OF KIDNEY AND URETER: ICD-10-CM

## 2024-01-31 DIAGNOSIS — I10 ESSENTIAL (PRIMARY) HYPERTENSION: ICD-10-CM

## 2024-01-31 DIAGNOSIS — I25.10 ATHEROSCLEROTIC HEART DISEASE OF NATIVE CORONARY ARTERY WITHOUT ANGINA PECTORIS: ICD-10-CM

## 2024-01-31 LAB
A1C WITH ESTIMATED AVERAGE GLUCOSE RESULT: 6.8 % — HIGH (ref 4–5.6)
ANION GAP SERPL CALC-SCNC: 11 MMOL/L — SIGNIFICANT CHANGE UP (ref 7–14)
BLD GP AB SCN SERPL QL: NEGATIVE — SIGNIFICANT CHANGE UP
BUN SERPL-MCNC: 23 MG/DL — SIGNIFICANT CHANGE UP (ref 7–23)
CALCIUM SERPL-MCNC: 9.3 MG/DL — SIGNIFICANT CHANGE UP (ref 8.4–10.5)
CHLORIDE SERPL-SCNC: 102 MMOL/L — SIGNIFICANT CHANGE UP (ref 98–107)
CO2 SERPL-SCNC: 27 MMOL/L — SIGNIFICANT CHANGE UP (ref 22–31)
CREAT SERPL-MCNC: 0.98 MG/DL — SIGNIFICANT CHANGE UP (ref 0.5–1.3)
EGFR: 76 ML/MIN/1.73M2 — SIGNIFICANT CHANGE UP
ESTIMATED AVERAGE GLUCOSE: 148 — SIGNIFICANT CHANGE UP
GLUCOSE SERPL-MCNC: 144 MG/DL — HIGH (ref 70–99)
HCT VFR BLD CALC: 38.5 % — LOW (ref 39–50)
HGB BLD-MCNC: 12.7 G/DL — LOW (ref 13–17)
MCHC RBC-ENTMCNC: 28.3 PG — SIGNIFICANT CHANGE UP (ref 27–34)
MCHC RBC-ENTMCNC: 33 GM/DL — SIGNIFICANT CHANGE UP (ref 32–36)
MCV RBC AUTO: 85.9 FL — SIGNIFICANT CHANGE UP (ref 80–100)
NRBC # BLD: 0 /100 WBCS — SIGNIFICANT CHANGE UP (ref 0–0)
NRBC # FLD: 0 K/UL — SIGNIFICANT CHANGE UP (ref 0–0)
PLATELET # BLD AUTO: 280 K/UL — SIGNIFICANT CHANGE UP (ref 150–400)
POTASSIUM SERPL-MCNC: 4.3 MMOL/L — SIGNIFICANT CHANGE UP (ref 3.5–5.3)
POTASSIUM SERPL-SCNC: 4.3 MMOL/L — SIGNIFICANT CHANGE UP (ref 3.5–5.3)
RBC # BLD: 4.48 M/UL — SIGNIFICANT CHANGE UP (ref 4.2–5.8)
RBC # FLD: 13.2 % — SIGNIFICANT CHANGE UP (ref 10.3–14.5)
RH IG SCN BLD-IMP: POSITIVE — SIGNIFICANT CHANGE UP
RH IG SCN BLD-IMP: POSITIVE — SIGNIFICANT CHANGE UP
SODIUM SERPL-SCNC: 140 MMOL/L — SIGNIFICANT CHANGE UP (ref 135–145)
WBC # BLD: 6.89 K/UL — SIGNIFICANT CHANGE UP (ref 3.8–10.5)
WBC # FLD AUTO: 6.89 K/UL — SIGNIFICANT CHANGE UP (ref 3.8–10.5)

## 2024-01-31 RX ORDER — ATORVASTATIN CALCIUM 80 MG/1
1 TABLET, FILM COATED ORAL
Refills: 0 | DISCHARGE

## 2024-01-31 RX ORDER — SODIUM CHLORIDE 9 MG/ML
1000 INJECTION, SOLUTION INTRAVENOUS
Refills: 0 | Status: DISCONTINUED | OUTPATIENT
Start: 2024-02-06 | End: 2024-02-06

## 2024-01-31 RX ORDER — ASPIRIN/CALCIUM CARB/MAGNESIUM 324 MG
0 TABLET ORAL
Refills: 0 | DISCHARGE

## 2024-01-31 NOTE — H&P PST ADULT - GENITOURINARY COMMENTS
not examined Hx BPH - CT scan done for hematuria 7/23 noted right renal mass - pt denies recurrence of hematuria and denies flank pain

## 2024-01-31 NOTE — H&P PST ADULT - CONSTITUTIONAL
well-groomed Sski Pregnancy And Lactation Text: This medication is Pregnancy Category D and isn't considered safe during pregnancy. It is excreted in breast milk.

## 2024-01-31 NOTE — H&P PST ADULT - PROBLEM SELECTOR PLAN 1
Pt scheduled for surgery and preop instructions including instructions for taking Famotidine on the day of surgery, given verbally and with use of  written materials, and patient confirming understanding of such instructions using  teach back method.  Cardiac evaluation in chart with preop Plavix instructions - pt stopped Plavix/ASA as of 1/30/24 and told to restart ASA 81 mg po for stent protection today - email of same sent to surgeon Pt scheduled for surgery and preop instructions including instructions for taking Famotidine on the day of surgery, given verbally and with use of  written materials, and patient confirming understanding of such instructions using  teach back method.  Cardiac evaluation in chart with preop Plavix instructions - pt stopped Plavix/ASA as of 1/30/24  Spoke with Dr Prado and as per [facility] policy pt to protect stents placed 4/23 and restart ASA 81 mg po today - pt confirmed understanding  and told to restart ASA 81 mg po for stent protection today - call and email of same sent to surgeon office

## 2024-01-31 NOTE — H&P PST ADULT - LANGUAGE ASSISTANCE NEEDED
Yes-Patient/Caregiver accepts free interpretation services... Wife assisted with MH/Yes-Patient/Caregiver accepts free interpretation services...

## 2024-01-31 NOTE — H&P PST ADULT - NSICDXPASTMEDICALHX_GEN_ALL_CORE_FT
PAST MEDICAL HISTORY:  Benign prostate hyperplasia     Diabetes type 2    High cholesterol     Hypertension     Kidney disorder      PAST MEDICAL HISTORY:  Benign prostate hyperplasia     CAD (coronary artery disease)     Diabetes type 2    High cholesterol     Hypertension     Kidney disorder

## 2024-01-31 NOTE — H&P PST ADULT - NSANTHOSAYNRD_GEN_A_CORE
No. LOTUS screening performed.  STOP BANG Legend: 0-2 = LOW Risk; 3-4 = INTERMEDIATE Risk; 5-8 = HIGH Risk

## 2024-01-31 NOTE — H&P PST ADULT - REASON FOR ADMISSION
Health Maintenance Due   Topic Date Due    Diabetes Urine Screening  03/11/2021    Influenza Vaccine (1) 09/01/2021    COVID-19 Vaccine (3 - Booster for Moderna series) 09/26/2021     Updates were requested from care everywhere.  Chart was reviewed for overdue Proactive [facility] Encounters (SAE[facility]) topics (CRS, Breast Cancer Screening, Eye exam)  Health Maintenance has been updated.  LINKS immunization registry triggered.  Immunizations were reconciled.     cardiac catheterization renal mass - right

## 2024-01-31 NOTE — H&P PST ADULT - NSICDXPASTSURGICALHX_GEN_ALL_CORE_FT
PAST SURGICAL HISTORY:  History of inguinal hernia repair      PAST SURGICAL HISTORY:  History of cardiac cath     History of inguinal hernia repair

## 2024-01-31 NOTE — H&P PST ADULT - HISTORY OF PRESENT ILLNESS
Pharmacy: [facility] -medications confirmed with patient and wife at bedside  Cardiologist: Dr Oscar Cohen  Escort: Wife and Son    83 year old Belarusian/English speaking M with PMHx HTN, HLD, DM Type II, and AI, presented to Dr Oscar Cohen with c/o worsening CP and SOB per Dr. Cohen's note.  Patient also recently had a syncopal episode - while on a ladder, patient reportedly lost consciousness and fell about 5 feet, striking his head. He refused to go to ER at that time. Patient recently underwent CCTA (4/10/23) revealing mild non-obstructive CAD , however, extensive calcification and associated artifact limiting evaluation of proximal and mid left anterior descending artery, with calcium score of 1041 corresponding to 90%. incidental finding of indeterminate 3 mm LLL pulmonary nodule. Patient also underwent TTE (3/21/23) revealing normal global wall motion with EF 57%, mild AR. Patient otherwise denies dizziness, palpitations, diaphoresis, N/V, orthopnea/PND, leg swelling,  bleeding, melena/hematochezia, fever, chills, URI symptoms, or recent illness.     In light of risk factors, CCS class Angina Class II-III symptoms and abnormal CCTA with significantly elevated calcium score , pt now presents for cardiac catheterization with possible intervention if clinically indicated.  Pt is a 83 yr old male Portuguese speaking scheduled for Robotic Assisted laparoscopic Right Partial Nephrectomy Poss Radical Poss Open with Dr Castrejon tentatively 2/6/24 - pt hx CAD with 2 stents placed 4/23 - on Plavix / ASA 81 mg - off plavix 1/30/24 - pt hx BPH, HTN, HLD - found to have renal mass on CT scan done to evaluate gross hematuria 7/23 - - pt denies further hematuria or flank pain

## 2024-02-05 ENCOUNTER — TRANSCRIPTION ENCOUNTER (OUTPATIENT)
Age: 84
End: 2024-02-05

## 2024-02-05 NOTE — ASU PATIENT PROFILE, ADULT - FALL HARM RISK - UNIVERSAL INTERVENTIONS
Bed in lowest position, wheels locked, appropriate side rails in place/Call bell, personal items and telephone in reach/Instruct patient to call for assistance before getting out of bed or chair/Non-slip footwear when patient is out of bed/[place] to call system/Physically safe environment - no spills, clutter or unnecessary equipment/Purposeful Proactive Rounding/Room/bathroom lighting operational, light cord in reach

## 2024-02-05 NOTE — ASU PATIENT PROFILE, ADULT - METHOD:
Patient is here for follow up of Left CMC DOS: 2/13/17  Rates pain on a scale of 1 - 10:  No complaints    Work related: No  DOI: N/A  Work status: Working with restrictions  Refills requested: No  Work school letters requested: No    Latex allergy or symptoms of Latex sensitivity:  No  Medications, tobacco, and allergies reviewed with the patient.    Patient is following up for left CMC and right shoulder pain.       verbal

## 2024-02-05 NOTE — ASU PATIENT PROFILE, ADULT - NSICDXPASTMEDICALHX_GEN_ALL_CORE_FT
PAST MEDICAL HISTORY:  Benign prostate hyperplasia     CAD (coronary artery disease)     Diabetes type 2    High cholesterol     Hypertension     Kidney disorder

## 2024-02-06 ENCOUNTER — TRANSCRIPTION ENCOUNTER (OUTPATIENT)
Age: 84
End: 2024-02-06

## 2024-02-06 ENCOUNTER — APPOINTMENT (OUTPATIENT)
Age: 84
End: 2024-02-06

## 2024-02-06 ENCOUNTER — RESULT REVIEW (OUTPATIENT)
Age: 84
End: 2024-02-06

## 2024-02-06 ENCOUNTER — INPATIENT (INPATIENT)
Facility: HOSPITAL | Age: 84
LOS: 0 days | Discharge: HOME | End: 2024-02-07
Attending: UROLOGY | Admitting: UROLOGY
Payer: MEDICARE

## 2024-02-06 VITALS
HEART RATE: 57 BPM | HEIGHT: 66.5 IN | WEIGHT: 154.98 LBS | RESPIRATION RATE: 16 BRPM | OXYGEN SATURATION: 97 % | TEMPERATURE: 98 F | SYSTOLIC BLOOD PRESSURE: 122 MMHG | DIASTOLIC BLOOD PRESSURE: 60 MMHG

## 2024-02-06 DIAGNOSIS — Z98.890 OTHER SPECIFIED POSTPROCEDURAL STATES: Chronic | ICD-10-CM

## 2024-02-06 DIAGNOSIS — N28.89 OTHER SPECIFIED DISORDERS OF KIDNEY AND URETER: ICD-10-CM

## 2024-02-06 LAB
GLUCOSE BLDC GLUCOMTR-MCNC: 117 MG/DL — HIGH (ref 70–99)
GLUCOSE BLDC GLUCOMTR-MCNC: 133 MG/DL — HIGH (ref 70–99)
GLUCOSE BLDC GLUCOMTR-MCNC: 136 MG/DL — HIGH (ref 70–99)
GLUCOSE BLDC GLUCOMTR-MCNC: 140 MG/DL — HIGH (ref 70–99)
GLUCOSE BLDC GLUCOMTR-MCNC: 152 MG/DL — HIGH (ref 70–99)

## 2024-02-06 PROCEDURE — 88305 TISSUE EXAM BY PATHOLOGIST: CPT | Mod: 26

## 2024-02-06 PROCEDURE — 50543 LAPARO PARTIAL NEPHRECTOMY: CPT | Mod: RT

## 2024-02-06 PROCEDURE — 88307 TISSUE EXAM BY PATHOLOGIST: CPT | Mod: 26

## 2024-02-06 PROCEDURE — 50543 LAPARO PARTIAL NEPHRECTOMY: CPT | Mod: AS

## 2024-02-06 PROCEDURE — 88331 PATH CONSLTJ SURG 1 BLK 1SPC: CPT | Mod: 26

## 2024-02-06 DEVICE — VISTASEAL FIBRIN HUMAN 10ML: Type: IMPLANTABLE DEVICE | Status: FUNCTIONAL

## 2024-02-06 DEVICE — LIGATING CLIPS WECK HEMOLOK POLYMER LARGE (PURPLE) 6: Type: IMPLANTABLE DEVICE | Status: FUNCTIONAL

## 2024-02-06 DEVICE — SURGICEL 2 X 14": Type: IMPLANTABLE DEVICE | Status: FUNCTIONAL

## 2024-02-06 RX ORDER — ACETAMINOPHEN 500 MG
975 TABLET ORAL EVERY 6 HOURS
Refills: 0 | Status: DISCONTINUED | OUTPATIENT
Start: 2024-02-06 | End: 2024-02-07

## 2024-02-06 RX ORDER — DEXTROSE 50 % IN WATER 50 %
25 SYRINGE (ML) INTRAVENOUS ONCE
Refills: 0 | Status: DISCONTINUED | OUTPATIENT
Start: 2024-02-06 | End: 2024-02-07

## 2024-02-06 RX ORDER — LOSARTAN POTASSIUM 100 MG/1
100 TABLET, FILM COATED ORAL DAILY
Refills: 0 | Status: DISCONTINUED | OUTPATIENT
Start: 2024-02-06 | End: 2024-02-07

## 2024-02-06 RX ORDER — ATORVASTATIN CALCIUM 80 MG/1
1 TABLET, FILM COATED ORAL
Refills: 0 | DISCHARGE

## 2024-02-06 RX ORDER — INSULIN LISPRO 100/ML
VIAL (ML) SUBCUTANEOUS AT BEDTIME
Refills: 0 | Status: DISCONTINUED | OUTPATIENT
Start: 2024-02-06 | End: 2024-02-07

## 2024-02-06 RX ORDER — FINASTERIDE 5 MG/1
1 TABLET, FILM COATED ORAL
Refills: 0 | DISCHARGE

## 2024-02-06 RX ORDER — BENZOCAINE AND MENTHOL 5; 1 G/100ML; G/100ML
1 LIQUID ORAL
Refills: 0 | Status: DISCONTINUED | OUTPATIENT
Start: 2024-02-06 | End: 2024-02-07

## 2024-02-06 RX ORDER — ASPIRIN/CALCIUM CARB/MAGNESIUM 324 MG
81 TABLET ORAL DAILY
Refills: 0 | Status: DISCONTINUED | OUTPATIENT
Start: 2024-02-06 | End: 2024-02-07

## 2024-02-06 RX ORDER — METFORMIN HYDROCHLORIDE 850 MG/1
1 TABLET ORAL
Refills: 0 | DISCHARGE

## 2024-02-06 RX ORDER — DEXTROSE 50 % IN WATER 50 %
12.5 SYRINGE (ML) INTRAVENOUS ONCE
Refills: 0 | Status: DISCONTINUED | OUTPATIENT
Start: 2024-02-06 | End: 2024-02-07

## 2024-02-06 RX ORDER — LOSARTAN/HYDROCHLOROTHIAZIDE 100MG-25MG
1 TABLET ORAL
Refills: 0 | DISCHARGE

## 2024-02-06 RX ORDER — KETOROLAC TROMETHAMINE 30 MG/ML
15 SYRINGE (ML) INJECTION EVERY 6 HOURS
Refills: 0 | Status: DISCONTINUED | OUTPATIENT
Start: 2024-02-06 | End: 2024-02-07

## 2024-02-06 RX ORDER — TAMSULOSIN HYDROCHLORIDE 0.4 MG/1
1 CAPSULE ORAL
Refills: 0 | DISCHARGE

## 2024-02-06 RX ORDER — SODIUM CHLORIDE 9 MG/ML
1000 INJECTION, SOLUTION INTRAVENOUS
Refills: 0 | Status: DISCONTINUED | OUTPATIENT
Start: 2024-02-06 | End: 2024-02-07

## 2024-02-06 RX ORDER — GLUCAGON INJECTION, SOLUTION 0.5 MG/.1ML
1 INJECTION, SOLUTION SUBCUTANEOUS ONCE
Refills: 0 | Status: DISCONTINUED | OUTPATIENT
Start: 2024-02-06 | End: 2024-02-07

## 2024-02-06 RX ORDER — INSULIN LISPRO 100/ML
VIAL (ML) SUBCUTANEOUS
Refills: 0 | Status: DISCONTINUED | OUTPATIENT
Start: 2024-02-06 | End: 2024-02-07

## 2024-02-06 RX ORDER — ATORVASTATIN CALCIUM 80 MG/1
40 TABLET, FILM COATED ORAL AT BEDTIME
Refills: 0 | Status: DISCONTINUED | OUTPATIENT
Start: 2024-02-06 | End: 2024-02-07

## 2024-02-06 RX ORDER — DEXTROSE 50 % IN WATER 50 %
15 SYRINGE (ML) INTRAVENOUS ONCE
Refills: 0 | Status: DISCONTINUED | OUTPATIENT
Start: 2024-02-06 | End: 2024-02-07

## 2024-02-06 RX ORDER — HEPARIN SODIUM 5000 [USP'U]/ML
5000 INJECTION INTRAVENOUS; SUBCUTANEOUS EVERY 8 HOURS
Refills: 0 | Status: DISCONTINUED | OUTPATIENT
Start: 2024-02-06 | End: 2024-02-07

## 2024-02-06 RX ORDER — FINASTERIDE 5 MG/1
5 TABLET, FILM COATED ORAL DAILY
Refills: 0 | Status: DISCONTINUED | OUTPATIENT
Start: 2024-02-06 | End: 2024-02-07

## 2024-02-06 RX ORDER — HYDROMORPHONE HYDROCHLORIDE 2 MG/ML
0.5 INJECTION INTRAMUSCULAR; INTRAVENOUS; SUBCUTANEOUS
Refills: 0 | Status: DISCONTINUED | OUTPATIENT
Start: 2024-02-06 | End: 2024-02-06

## 2024-02-06 RX ORDER — TAMSULOSIN HYDROCHLORIDE 0.4 MG/1
0.4 CAPSULE ORAL AT BEDTIME
Refills: 0 | Status: DISCONTINUED | OUTPATIENT
Start: 2024-02-06 | End: 2024-02-07

## 2024-02-06 RX ORDER — ONDANSETRON 8 MG/1
4 TABLET, FILM COATED ORAL ONCE
Refills: 0 | Status: DISCONTINUED | OUTPATIENT
Start: 2024-02-06 | End: 2024-02-06

## 2024-02-06 RX ORDER — ASPIRIN/CALCIUM CARB/MAGNESIUM 324 MG
1 TABLET ORAL
Refills: 0 | DISCHARGE

## 2024-02-06 RX ADMIN — TAMSULOSIN HYDROCHLORIDE 0.4 MILLIGRAM(S): 0.4 CAPSULE ORAL at 21:53

## 2024-02-06 RX ADMIN — Medication 975 MILLIGRAM(S): at 23:33

## 2024-02-06 RX ADMIN — HEPARIN SODIUM 5000 UNIT(S): 5000 INJECTION INTRAVENOUS; SUBCUTANEOUS at 16:22

## 2024-02-06 RX ADMIN — BENZOCAINE AND MENTHOL 1 LOZENGE: 5; 1 LIQUID ORAL at 23:31

## 2024-02-06 RX ADMIN — Medication 15 MILLIGRAM(S): at 23:31

## 2024-02-06 RX ADMIN — ATORVASTATIN CALCIUM 40 MILLIGRAM(S): 80 TABLET, FILM COATED ORAL at 21:53

## 2024-02-06 RX ADMIN — HEPARIN SODIUM 5000 UNIT(S): 5000 INJECTION INTRAVENOUS; SUBCUTANEOUS at 23:31

## 2024-02-06 NOTE — PATIENT PROFILE ADULT - NSPROHMSYMPCOND_GEN_A_NUR
Patient discharged to home in stable condition. Written and verbal after care 
instructions given. Patient verbalizes understanding of instruction. IV 
removed. Catheter intact and site benign. Pressure and 4x4 applied to site. No 
bleeding noted. diabetes

## 2024-02-06 NOTE — PATIENT PROFILE ADULT - FALL HARM RISK - HARM RISK INTERVENTIONS

## 2024-02-06 NOTE — ASU PREOP CHECKLIST - ALLERGY BAND ON
no known allergies Wartpeel Counseling:  I discussed with the patient the risks of Wartpeel including but not limited to erythema, scaling, itching, weeping, crusting, and pain.

## 2024-02-06 NOTE — BRIEF OPERATIVE NOTE - COMMENTS
I, Nelly Borja PA-C, served as the first assistant in this operation. I assisted in placing ports, docking, and targeting the da Gallito robot, first assisted at the surgical field while the surgeon was performing the operation at the robotic console by providing instrument exchanges, tissue retraction, suction and irrigation, specimen retrieval, passing and removing sutures and sponges, undocking the robotic platform, and closed surgical wounds. I, Nelly Borja PA-C, served as the first assistant in this operation. I assisted in placing ports, docking, and targeting the da Gallito robot, first assisted at the surgical field while the surgeon was performing the operation at the robotic console by providing instrument exchanges, tissue retraction, suction and irrigation, specimen retrieval, passing and removing sutures and sponges, undocking the robotic platform, and closed surgical wounds.    #[id number]

## 2024-02-06 NOTE — PACU DISCHARGE NOTE - NSPTMEETSDISCHCRITERIADT_GEN_A_CORE
06-Feb-2024 15:59 [de-identified] : She is still suffering with joint pain but doesn't want to go back to the rheumatologist

## 2024-02-06 NOTE — PROGRESS NOTE ADULT - ASSESSMENT
No acute events during PACU course. Pain controlled and tolerating sips. PT is s/p Robot-assisted partial nephrectomy. Continue to optimize for discharge    Strict I&O's  Analgesia Antiemetics  DVT prophylaxis  Incentive spirometry  Clears / OOB  AM labs

## 2024-02-06 NOTE — BRIEF OPERATIVE NOTE - PRIMARY SURGEON
Called patient and left message.  The morning blood sugar numbers have been higher, anywhere between 142 and 245.  During daytime, prior to lunch, his blood sugar is well controlled, with an average around 101 110.  At bedtime, his blood sugar is frequently elevated (he does not consistently check it).  Currently, the dose of Tresiba is 46 units and the dose of NovoLog is 1 unit per 5 g carbohydrates for dinner.  I recommended to decrease the dose of Tresiba at 44 units and take more NovoLog for dinner, by using an insulin to carbohydrate ratio of 1 unit per 3 g.   Cho

## 2024-02-06 NOTE — ASU PREOP CHECKLIST - COMMENTS
losartan, finasteride and pepcid taken this am with a sip of water tamsulosin, losartan, finasteride and pepcid taken this am with a sip of water

## 2024-02-07 ENCOUNTER — TRANSCRIPTION ENCOUNTER (OUTPATIENT)
Age: 84
End: 2024-02-07

## 2024-02-07 VITALS — SYSTOLIC BLOOD PRESSURE: 114 MMHG | DIASTOLIC BLOOD PRESSURE: 54 MMHG | HEART RATE: 60 BPM

## 2024-02-07 LAB
ANION GAP SERPL CALC-SCNC: 9 MMOL/L — SIGNIFICANT CHANGE UP (ref 7–14)
BUN SERPL-MCNC: 22 MG/DL — SIGNIFICANT CHANGE UP (ref 7–23)
CALCIUM SERPL-MCNC: 8.2 MG/DL — LOW (ref 8.4–10.5)
CHLORIDE SERPL-SCNC: 105 MMOL/L — SIGNIFICANT CHANGE UP (ref 98–107)
CO2 SERPL-SCNC: 26 MMOL/L — SIGNIFICANT CHANGE UP (ref 22–31)
CREAT FLD-MCNC: 1.27 MG/DL — SIGNIFICANT CHANGE UP
CREAT SERPL-MCNC: 1.27 MG/DL — SIGNIFICANT CHANGE UP (ref 0.5–1.3)
EGFR: 56 ML/MIN/1.73M2 — LOW
GLUCOSE BLDC GLUCOMTR-MCNC: 116 MG/DL — HIGH (ref 70–99)
GLUCOSE BLDC GLUCOMTR-MCNC: 197 MG/DL — HIGH (ref 70–99)
GLUCOSE SERPL-MCNC: 118 MG/DL — HIGH (ref 70–99)
HCT VFR BLD CALC: 34 % — LOW (ref 39–50)
HGB BLD-MCNC: 11.4 G/DL — LOW (ref 13–17)
MCHC RBC-ENTMCNC: 29.1 PG — SIGNIFICANT CHANGE UP (ref 27–34)
MCHC RBC-ENTMCNC: 33.5 GM/DL — SIGNIFICANT CHANGE UP (ref 32–36)
MCV RBC AUTO: 86.7 FL — SIGNIFICANT CHANGE UP (ref 80–100)
NRBC # BLD: 0 /100 WBCS — SIGNIFICANT CHANGE UP (ref 0–0)
NRBC # FLD: 0 K/UL — SIGNIFICANT CHANGE UP (ref 0–0)
PLATELET # BLD AUTO: 217 K/UL — SIGNIFICANT CHANGE UP (ref 150–400)
POTASSIUM SERPL-MCNC: 3.9 MMOL/L — SIGNIFICANT CHANGE UP (ref 3.5–5.3)
POTASSIUM SERPL-SCNC: 3.9 MMOL/L — SIGNIFICANT CHANGE UP (ref 3.5–5.3)
RBC # BLD: 3.92 M/UL — LOW (ref 4.2–5.8)
RBC # FLD: 13.2 % — SIGNIFICANT CHANGE UP (ref 10.3–14.5)
SODIUM SERPL-SCNC: 140 MMOL/L — SIGNIFICANT CHANGE UP (ref 135–145)
WBC # BLD: 6.49 K/UL — SIGNIFICANT CHANGE UP (ref 3.8–10.5)
WBC # FLD AUTO: 6.49 K/UL — SIGNIFICANT CHANGE UP (ref 3.8–10.5)

## 2024-02-07 PROCEDURE — 99223 1ST HOSP IP/OBS HIGH 75: CPT

## 2024-02-07 RX ORDER — ACETAMINOPHEN 500 MG
3 TABLET ORAL
Qty: 0 | Refills: 0 | DISCHARGE
Start: 2024-02-07

## 2024-02-07 RX ADMIN — Medication 81 MILLIGRAM(S): at 11:42

## 2024-02-07 RX ADMIN — Medication 2: at 11:41

## 2024-02-07 RX ADMIN — LOSARTAN POTASSIUM 100 MILLIGRAM(S): 100 TABLET, FILM COATED ORAL at 05:17

## 2024-02-07 RX ADMIN — Medication 15 MILLIGRAM(S): at 00:31

## 2024-02-07 RX ADMIN — Medication 15 MILLIGRAM(S): at 12:06

## 2024-02-07 RX ADMIN — HEPARIN SODIUM 5000 UNIT(S): 5000 INJECTION INTRAVENOUS; SUBCUTANEOUS at 08:30

## 2024-02-07 RX ADMIN — Medication 975 MILLIGRAM(S): at 00:33

## 2024-02-07 RX ADMIN — HEPARIN SODIUM 5000 UNIT(S): 5000 INJECTION INTRAVENOUS; SUBCUTANEOUS at 14:51

## 2024-02-07 RX ADMIN — Medication 975 MILLIGRAM(S): at 12:06

## 2024-02-07 RX ADMIN — Medication 15 MILLIGRAM(S): at 06:15

## 2024-02-07 RX ADMIN — Medication 975 MILLIGRAM(S): at 11:42

## 2024-02-07 RX ADMIN — FINASTERIDE 5 MILLIGRAM(S): 5 TABLET, FILM COATED ORAL at 11:43

## 2024-02-07 RX ADMIN — Medication 15 MILLIGRAM(S): at 05:15

## 2024-02-07 RX ADMIN — Medication 15 MILLIGRAM(S): at 11:42

## 2024-02-07 NOTE — DISCHARGE NOTE PROVIDER - NSDCCPCAREPLAN_GEN_ALL_CORE_FT
PRINCIPAL DISCHARGE DIAGNOSIS  Diagnosis: Kidney mass  Assessment and Plan of Treatment: You underwent robotic assisted lap right partial nephrectomy. Call the office if you have fever greater than 101, difficulty urinating, pain not relieved with pain medication, nausea/vomiting, bleeding that does not stop or other worrisome symptoms arise.     PRINCIPAL DISCHARGE DIAGNOSIS  Diagnosis: Kidney mass  Assessment and Plan of Treatment: You underwent robotic assisted lap right partial nephrectomy. Call the office if you have fever greater than 101, difficulty urinating, pain not relieved with pain medication, nausea/vomiting, bleeding that does not stop or other worrisome symptoms arise.      SECONDARY DISCHARGE DIAGNOSES  Diagnosis: CAD (coronary artery disease)  Assessment and Plan of Treatment: You may resume your Plavix tomorrow 2/8/2024.

## 2024-02-07 NOTE — CONSULT NOTE ADULT - ASSESSMENT
82 yo M w/ CAD s/p stents (2023), DM2 (A1C 6.8), HTN, HLD, BPH, initially presented with hematuria, found to have renal mass on CT, admitted for robot assisted partial nephrectomy on 2/6, now POD#1, doing well.     # Renal mass s/p partial nephrectomy  - patient voiding, tolerating diet  - removal of drain as per urology  - pain control prn, patient reports is adequate  - otpt urology followup  - f/u surgical path    # CAD s/p stents in 2023  - c/w asa   - plavix was held in anticipation of procedure, to resume when safe to do so from surgical perspective (planned to resume on 2/8)  - c/w statin (for CAD, HLD)  - currently denies CP  - otpt cards f/u    # DM2  - hold home metformin while hospitalized (can resume upon discharge), covered by sliding scale insulin while inpateint    # HTN  - currently on losartan  - resume home BP regimen as tolerated    # BPH  - on flomax/proscar    Dispo: meeting post-op milestones and d/c planning as per urology

## 2024-02-07 NOTE — PROGRESS NOTE ADULT - ASSESSMENT
83M s/p Robot-assisted partial nephrectomy. POD#1    Strict I&O's  Analgesia Antiemetics  DVT prophylaxis/OOD  Incentive spirometry  Advance to regular diet now that passed flatus  Dulcolax suppository  AM labs including NICHOLE Cr  Catheter removed, TOV

## 2024-02-07 NOTE — DISCHARGE NOTE PROVIDER - NSDCMRMEDTOKEN_GEN_ALL_CORE_FT
aspirin 81 mg oral delayed release tablet: 1 tab(s) orally once a day  atorvastatin 40 mg oral tablet: 1 tab(s) orally once a day  clopidogrel 75 mg oral tablet: 1 tab(s) orally once a day  finasteride 5 mg oral tablet: 1 tab(s) orally once a day  losartan-hydroCHLOROthiazide 100 mg-25 mg oral tablet: 1 tab(s) orally once a day  metFORMIN 850 mg oral tablet: 1 tab(s) orally 2 times a day  tamsulosin 0.4 mg oral capsule: 1 cap(s) orally once a day   acetaminophen 325 mg oral tablet: 3 tab(s) orally every 6 hours  aspirin 81 mg oral delayed release tablet: 1 tab(s) orally once a day  atorvastatin 40 mg oral tablet: 1 tab(s) orally once a day  finasteride 5 mg oral tablet: 1 tab(s) orally once a day  losartan-hydroCHLOROthiazide 100 mg-25 mg oral tablet: 1 tab(s) orally once a day  metFORMIN 850 mg oral tablet: 1 tab(s) orally 2 times a day  tamsulosin 0.4 mg oral capsule: 1 cap(s) orally once a day

## 2024-02-07 NOTE — DISCHARGE NOTE PROVIDER - NSDCCPTREATMENT_GEN_ALL_CORE_FT
PRINCIPAL PROCEDURE  Procedure: Robot-assisted partial nephrectomy  Findings and Treatment: RIGHT

## 2024-02-07 NOTE — PROGRESS NOTE ADULT - SUBJECTIVE AND OBJECTIVE BOX
ANESTHESIA POSTOP CHECK    83y Male POSTOP DAY 1     No COMPLAINTS    NO APPARENT ANESTHESIA COMPLICATIONS      
 Note    Post op Check    s/p : Robot-assisted partial nephrectomy    Pt seen / examined without complaints pain controlled    Vital Signs Last 24 Hrs  T(C): 36.6 (06 Feb 2024 17:00), Max: 36.8 (06 Feb 2024 06:23)  T(F): 97.9 (06 Feb 2024 17:00), Max: 98.2 (06 Feb 2024 06:23)  HR: 52 (06 Feb 2024 17:00) (47 - 62)  BP: 154/54 (06 Feb 2024 17:00) (118/61 - 154/54)  BP(mean): 77 (06 Feb 2024 17:00) (68 - 85)  RR: 20 (06 Feb 2024 17:00) (11 - 20)  SpO2: 100% (06 Feb 2024 17:00) (97% - 100%)    Parameters below as of 06 Feb 2024 17:00  Patient On (Oxygen Delivery Method): room air    I&O's Summary    06 Feb 2024 07:01  -  06 Feb 2024 17:15  --------------------------------------------------------  IN: 620 mL / OUT: 560 mL / NET: 60 mL    Vital signs reviewed.   CONSTITUTIONAL: Well-appearing; well-nourished; in no apparent distress. Non-toxic appearing.   HEAD: Normocephalic, atraumatic.  EYES: Normal conjunctiva and no sclera injection noted  ENT: normal nose; no rhinorrhea.  CARD: Normal S1, S2  RESP: Normal chest excursion with respiration  ABD/GI: Surgical sites clean, dry, intact. NICHOLE drain w/ sanguinous output. Soft, non-distended; non-tender  EXT/MS: Moves all extremities  SKIN: Normal for age and race; warm; dry; good turgor; no apparent lesions or exudate noted.  NEURO: Awake, alert, oriented x 3  PSYCH: Normal mood; appropriate affect.    Rebol[place]o 500 CC billy colored urine  NICHOLE Drain 30 CC sanguinous output
Subjective: Pt seen and examined. Feeling well this AM. Passed flatus. Feeling hungry. Denies acute pain or other acute complaints this AM    Objective    Vital signs  T(F): , Max: 98.8 (02-07-24 @ 01:55)  HR: 57 (02-07-24 @ 05:41)  BP: 126/55 (02-07-24 @ 05:41)  SpO2: 94% (02-07-24 @ 05:41)    Output     OUT:    Bulb (mL): 135 mL    Ureteral Catheter (mL): 3555 mL  Total OUT: 3690 mL    Total NET: -3690 mL      Gen: NAD  Resp: No respiratory distress  Abd: soft, mild distension, nontender. incisions c/d/i. NICHOLE in place with SS output in bulb  : mallory in place with yellow urine    Labs  AM labs pending

## 2024-02-07 NOTE — DISCHARGE NOTE PROVIDER - NSDCFUADDINST_GEN_ALL_CORE_FT
PAIN CONTROL: You may take 650 mg of Tylenol every 4-6 hours. Do not exceed more than 4000mg or 4 grams of Tylenol daily. You may take Tylenol and Ibuprofen as needed for pain. If you were prescribed narcotic pain medication, take as directed. You should also take senna to prevent constipation.    BATHING: Please do not submerge wound underwater. You may shower and/or sponge bathe.    ACTIVITY: No heavy lifting or straining. Otherwise, you may return to your usual level of physical activity. If you are taking narcotic pain medications (such as oxycodone), do NOT drive a car, operate machinery or make important decisions.    DIET: advance your diet slowly as tolerated.  Eat small, frequent amounts, your appetite will take a while to return to normal.     NOTIFY YOUR SURGEON IF: You have any bleeding that does not stop, any fevers (over 100.4F) or chills, persistent nausea/vomiting, persistent diarrhea, your pain is not controlled on your discharge pain medications, heavy bleeding in the urine, inability to urinate, or other worrisome symptoms arise.    FOLLOW UP:  1. Please call your surgeon to make a follow up appointment within two weeks of discharge  2: Please follow up with your primary care physician within 1-2 weeks regarding your hospitalization.

## 2024-02-07 NOTE — DISCHARGE NOTE PROVIDER - HOSPITAL COURSE
83M with PMHx of CAD s/p stents, DM, HTN admitted for robotic assisted lap right partial nephrectomy with Dr. Castrejon on 2/6/2024 (see operative report for further details). Pt did well post operatively. POD#1 catheter removed and passed TOV. Advanced to regular diet and tolerated. NICHOLE removed prior to DC. Pt did well post op. At the time of discharge, the patient was hemodynamically stable, was tolerating PO diet, was voiding urine and passing flatus, was ambulating, and was comfortable with adequate pain control. Pt/family given discharge plan/instructions and agrees with plan.

## 2024-02-07 NOTE — CONSULT NOTE ADULT - TIME BILLING
Reviewing labs/vitals/otpt records, prior cath report, using  services, interviewing/examining patient, counseling on antiplatelet medications, speaking to cardiologist office, discussing with urology, documentation

## 2024-02-07 NOTE — DISCHARGE NOTE PROVIDER - CARE PROVIDER_API CALL
Kasie Castrejon [facility]  Urology  [address], [address] [place] [address]  Phone: [phone]  Fax: [phone]  Follow Up Time: 2 weeks

## 2024-02-07 NOTE — DISCHARGE NOTE NURSING/CASE MANAGEMENT/SOCIAL WORK - PATIENT PORTAL LINK FT
You can access the FollowMyHealth Patient Portal offered by [facility] by registering at the following website: [email or web address]. By joining [facility]’s FollowMyHealth portal, you will also be able to view your health information using other applications (apps) compatible with our system.

## 2024-02-07 NOTE — DISCHARGE NOTE PROVIDER - PROVIDER TOKENS
Name: Joshua Sanchez  Age: 41 y.o.  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   • Rheumatoid arthritis [M06.9] 2018   • Chest pain [R07.9] 2018      Resolved Hospital Problems    Diagnosis Date Noted Date Resolved   • Hemoptysis [R04.2] 2018   • LORENZA (acute kidney injury) [N17.9] 2018       SECONDARY DIAGNOSES  Past Medical History:   Diagnosis Date   • Adjustment disorder with mixed anxiety and depressed mood    • Alopecia totalis    • H/O Testicular hypofunction    • Hypertension    • Hypothyroidism    • Morbid obesity    • Osteoarthritis     Knee   • Rheumatoid arthritis    • Sleep apnea    • Type 2 diabetes mellitus    • Vitamin D deficiency        CONSULTS   Seth Killian MD, endocrinology  Agus Hanks MD cardiology    PROCEDURES PERFORMED  Echocardiogram      HOSPITAL COURSE  This is a 41-year-old gentleman was originally admitted to [facility] with a history of having cough with some bloody sputum and weakness.  Further investigation led to the finding of profound hypothyroidism myxedema and alopecia totalis.  He also had his CT which showed pericardial effusion with no tamponade.  Patient was transferred to [facility] for further care as there is no endocrinology covarge at [facility].  His hemoptysis resolved without any treatment.  He does  have symptoms of sleep apnea with snoring and nighttime desaturation.  His TSH is more than 100 and both free T4 and T3 were very low.  The endocrinologist started him on IV levothyroxine and manage his diabetes with both oral hypoglycemic agents and insulin.  Unfortunately patient was unable to take his medications as he has lost insurance.  Now he has his insurance and works 2 jobs.  At present patient is stable and will be discharged home with instructions to resume his work slowly and watch for fatigue and shortness of breath.  He also is instructed to follow-up with endocrinology, cardiology.  In addition he is going to home oxygen at nighttime to be used until his sleep studies done.  Arrangement has been done for him to follow-up at the [facility].      PHYSICAL EXAM  Temp:  [98.2 °F (36.8 °C)-98.5 °F (36.9 °C)] 98.5 °F (36.9 °C)  Heart Rate:  [] 89  Resp:  [18] 18  BP: (120-129)/(78-96) 129/78  Body mass index is 36.45 kg/m².  Physical Exam  HEENT: Unremarkable, pupils are round equal and reacting to light, patient has no body hair  NECK: No lymphadenopathy, throat is clear,   RESPRATORY SYSTEM: Breath sounds are equal on both sides and are normal, no wheezes or crackles  CARDIOVASULAR SYSTEM: Heart rate is regular without murmur  ABDOMEN: Soft, no ascites, no hepatosplenomegaly.  EXTREMITIES: No cyanosis, clubbing or edema    CONDITION ON DISCHARGE  Stable.      DISCHARGE DISPOSITION   Home      ALLERGIES  Allergies   Allergen Reactions   • Penicillins Other (See Comments)     Lips turn blue       RECENT LABS    Results from last 7 days  Lab Units 06/03/18  0411 05/29/18  0301 05/28/18  1745   WBC 10*3/mm3  --  12.97* 13.01*   HEMOGLOBIN g/dL  --  14.6 15.6   HEMATOCRIT %  --  42.4 44.8   PLATELETS 10*3/mm3 235 242 284       Results from last 7 days  Lab Units 06/04/18  0503 06/03/18  0411 06/02/18  0451   SODIUM mmol/L 143 142 142   POTASSIUM mmol/L 4.2 3.9 4.0   CHLORIDE mmol/L 101 100  101   CO2 mmol/L 27.9 30.4* 28.8   BUN mg/dL 27* 23* 23*   CREATININE mg/dL 1.39* 1.45* 1.57*   GLUCOSE mg/dL 126* 108* 141*   CALCIUM mg/dL 9.4 9.4 9.2       Results from last 7 days  Lab Units 05/29/18  0301   INR  0.96       Results from last 7 days  Lab Units 06/04/18  0503 06/03/18  0411  05/29/18  0301   TSH mIU/mL  --   --   --  >100.010*   T3 FREE pg/mL  --  2.27  < >  --    FREE T4 ng/dL 0.70* 0.68*  < > <0.10*   < > = values in this interval not displayed.    DIET;  Diet Order   Procedures   • Diet Regular; Consistent Carbohydrate, Cardiac       DISCHARGE MEDICATIONS     Your medication list      START taking these medications      Instructions Last Dose Given Next Dose Due   glimepiride 2 MG tablet  Commonly known as:  AMARYL      Take 1 tablet by mouth 2 (Two) Times a Day Before Meals.       insulin aspart 100 UNIT/ML injection  Commonly known as:  novoLOG      Inject 0-9 Units under the skin 4 (Four) Times a Day With Meals & at Bedtime.       insulin detemir 100 UNIT/ML injection  Commonly known as:  LEVEMIR      Inject 35 Units under the skin Every Night.       metFORMIN 1000 MG tablet  Commonly known as:  GLUCOPHAGE      2 tablets twice a day          CHANGE how you take these medications      Instructions Last Dose Given Next Dose Due   levothyroxine 75 MCG tablet  Commonly known as:  SYNTHROID, LEVOTHROID  What changed:  · medication strength  · how much to take  · Another medication with the same name was removed. Continue taking this medication, and follow the directions you see here.      Take 3 tablets by mouth Daily.          CONTINUE taking these medications      Instructions Last Dose Given Next Dose Due   amLODIPine 10 MG tablet  Commonly known as:  NORVASC      Take 10 mg by mouth Daily.       DULoxetine 60 MG capsule  Commonly known as:  CYMBALTA      Take 60 mg by mouth Daily.       fenofibrate 160 MG tablet      Take 160 mg by mouth Daily.       lisinopril 10 MG tablet  Commonly known  as:  PRINIVIL,ZESTRIL      Take 10 mg by mouth Daily.       loratadine 10 MG tablet  Commonly known as:  CLARITIN      Take 10 mg by mouth Daily.          STOP taking these medications    hydrochlorothiazide 12.5 MG tablet  Commonly known as:  HYDRODIURIL              Where to Get Your Medications      These medications were sent to [address] - [address] [place] [address] AT SEC  &  - [phone]  - [phone]  [address] [address], [facility] [address]    Phone:  [phone]   · glimepiride 2 MG tablet  · insulin aspart 100 UNIT/ML injection  · insulin detemir 100 UNIT/ML injection  · levothyroxine 75 MCG tablet  · metFORMIN 1000 MG tablet        No future appointments.  Follow-up Information     Seth Killian MD Follow up.    Specialty:  Endocrinology  Why:  Call to make an appointment to see in  4 - 6 weeks.  Contact information:  [address]  [facility] [address] [phone]             MIKEL Shi Follow up.    Specialty:  Family Medicine  Contact information:  [address] [facility] [id number] [phone]             Ralph Hall MD Follow up in 2 month(s).    Specialties:  Pulmonary Disease, Sleep Medicine, Internal Medicine  Why:  after the sleep study  Contact information:  [address]  [address]  [facility] [id number] [phone]             Agus Hanks MD Follow up in 3 week(s).    Specialty:  Cardiology  Why:  at [place] office  Contact information:  [address]  [facility] [id number] [phone]                   TEST  RESULTS PENDING AT DISCHARGE  None   Order Current Status    C-Peptide In process    Glutamic Acid Decarboxylase In process    Intrinsic Factor Ab In process           CODE STATUS  Full Code        Ralph Hall MD  [facility]  06/04/18      Time: greater than 35 minutes.   PROVIDER:[TOKEN:[[id number]],FOLLOWUP:[2 weeks]]

## 2024-02-07 NOTE — DISCHARGE NOTE NURSING/CASE MANAGEMENT/SOCIAL WORK - NSDCPNINST_GEN_ALL_CORE
Maintain abdominal incisions clean dry and intact, steri strips will fall off on their own in 1-2 weeks. Call MD with any signs of infection ie fever/shaking chills, redness or drainage, or with signs of bleeding or persistent nausea. Continue to drink plenty of fluids and avoid straining and constipation which may be a side effect from taking narcotic pain meds. No heavy lifting greater than 10 pounds (ie a gallon of milk.) Follow-up with MD as well as PMD in office as instructed for continuity of care post-operatively, as per safe Dc plan.

## 2024-02-07 NOTE — CONSULT NOTE ADULT - SUBJECTIVE AND OBJECTIVE BOX
Patient is a 83y old  Male who presents with a chief complaint of     HPI:      Allergies  No Known Allergies    Intolerances        HOME MEDICATIONS: Reviewed    MEDICATIONS  (STANDING):  acetaminophen     Tablet .. 975 milliGRAM(s) Oral every 6 hours  aspirin enteric coated 81 milliGRAM(s) Oral daily  atorvastatin 40 milliGRAM(s) Oral at bedtime  dextrose 5%. 1000 milliLiter(s) (100 mL/Hr) IV Continuous <Continuous>  dextrose 5%. 1000 milliLiter(s) (50 mL/Hr) IV Continuous <Continuous>  dextrose 50% Injectable 12.5 Gram(s) IV Push once  dextrose 50% Injectable 25 Gram(s) IV Push once  dextrose 50% Injectable 25 Gram(s) IV Push once  finasteride 5 milliGRAM(s) Oral daily  glucagon  Injectable 1 milliGRAM(s) IntraMuscular once  heparin   Injectable 5000 Unit(s) SubCutaneous every 8 hours  insulin lispro (ADMELOG) corrective regimen sliding scale   SubCutaneous at bedtime  insulin lispro (ADMELOG) corrective regimen sliding scale   SubCutaneous three times a day before meals  ketorolac   Injectable 15 milliGRAM(s) IV Push every 6 hours  lactated ringers. 1000 milliLiter(s) (75 mL/Hr) IV Continuous <Continuous>  losartan 100 milliGRAM(s) Oral daily  tamsulosin 0.4 milliGRAM(s) Oral at bedtime    MEDICATIONS  (PRN):  benzocaine/menthol Lozenge 1 Lozenge Oral every 1 hour PRN Sore Throat  dextrose Oral Gel 15 Gram(s) Oral once PRN Blood Glucose LESS THAN 70 milliGRAM(s)/deciliter      PAST MEDICAL & SURGICAL HISTORY:  Diabetes type 2  Hypertension  Benign prostate hyperplasia  High cholesterol  Kidney disorder  CAD (coronary artery disease)  History of inguinal hernia repair  History of cardiac cath          SOCIAL HISTORY:      FAMILY HISTORY:      REVIEW OF SYSTEMS:    [] Unable to obtain due to poor mental status    Vital Signs Last 24 Hrs  T(C): 36.9 (07 Feb 2024 08:52), Max: 37.1 (07 Feb 2024 01:55)  T(F): 98.5 (07 Feb 2024 08:52), Max: 98.8 (07 Feb 2024 01:55)  HR: 64 (07 Feb 2024 08:52) (47 - 83)  BP: 112/53 (07 Feb 2024 08:52) (112/53 - 154/54)  BP(mean): 77 (06 Feb 2024 17:00) (68 - 85)  RR: 16 (07 Feb 2024 08:52) (11 - 20)  SpO2: 96% (07 Feb 2024 08:52) (94% - 100%)    Parameters below as of 07 Feb 2024 08:52  Patient On (Oxygen Delivery Method): room air      CAPILLARY BLOOD GLUCOSE      POCT Blood Glucose.: 197 mg/dL (07 Feb 2024 11:26)  POCT Blood Glucose.: 116 mg/dL (07 Feb 2024 07:27)  POCT Blood Glucose.: 117 mg/dL (06 Feb 2024 22:26)  POCT Blood Glucose.: 133 mg/dL (06 Feb 2024 17:44)  POCT Blood Glucose.: 140 mg/dL (06 Feb 2024 16:27)  POCT Blood Glucose.: 152 mg/dL (06 Feb 2024 13:37)      PHYSICAL EXAM:    LABS:                        11.4   6.49  )-----------( 217      ( 07 Feb 2024 07:14 )             34.0     02-07    140  |  105  |  22  ----------------------------<  118<H>  3.9   |  26  |  1.27    Ca    8.2<L>      07 Feb 2024 07:14        Urinalysis Basic - ( 07 Feb 2024 07:14 )    Color: x / Appearance: x / SG: x / pH: x  Gluc: 118 mg/dL / Ketone: x  / Bili: x / Urobili: x   Blood: x / Protein: x / Nitrite: x   Leuk Esterase: x / RBC: x / WBC x   Sq Epi: x / Non Sq Epi: x / Bacteria: x      CAPILLARY BLOOD GLUCOSE      POCT Blood Glucose.: 197 mg/dL (07 Feb 2024 11:26)      RADIOLOGY & ADDITIONAL STUDIES:    Imaging:   Personally Reviewed:  [ ] YES               EKG:   Personally Reviewed:  [ ] YES       Consultant(s) notes reviewed:    Care Discussed with Consultant(s)/Other Providers:  Care Discussed with Primary Team.      [] Increased delirium risk  [] Delirium and other risks can be reduced by:          -early ambulation          -minimizing "tethers" - IV, oxygen, catheters, etc          -avoiding hypnotics and sedatives          -maintaining hydration/nutrition          -avoid anticholinergics - diphenhydramine, etc          -pain control          -supportive environment   Patient is a 83y old  Male who presents with a chief complaint of     Portuguese phone  ID# [id number] and also [phone] (utilized after first  call dropped)    HPI: 82 yo M w/ CAD s/p stents (2023), DM2 (A1C 6.8), HTN, HLD, BPH, initially presented with hematuria, found to have renal mass on CT, admitted for robot assisted partial nephrectomy on 2/6, now POD#1, doing well.     Patient denies CP/SOB (does reports some phlegm, has some throat discomfort likely related to OR), N/V/abdominal pain/fever/chills. Is tolerating diet. Encouraged incentive spirometry. His main concern is the output from the NICHOLE drain and some soaking of the surrounding dressing. His plavix was held pre-op in coordination with his cardiologist Dr. Cohen.     I spoke with Dr. Cohen's office, per staff they ideally would like to restart plavix on the day after surgery but understands that would be when safe to do so from surgical perspective.     Allergies  No Known Allergies    Intolerances    HOME MEDICATIONS: Reviewed    MEDICATIONS  (STANDING):  acetaminophen     Tablet .. 975 milliGRAM(s) Oral every 6 hours  aspirin enteric coated 81 milliGRAM(s) Oral daily  atorvastatin 40 milliGRAM(s) Oral at bedtime  dextrose 5%. 1000 milliLiter(s) (100 mL/Hr) IV Continuous <Continuous>  dextrose 5%. 1000 milliLiter(s) (50 mL/Hr) IV Continuous <Continuous>  dextrose 50% Injectable 12.5 Gram(s) IV Push once  dextrose 50% Injectable 25 Gram(s) IV Push once  dextrose 50% Injectable 25 Gram(s) IV Push once  finasteride 5 milliGRAM(s) Oral daily  glucagon  Injectable 1 milliGRAM(s) IntraMuscular once  heparin   Injectable 5000 Unit(s) SubCutaneous every 8 hours  insulin lispro (ADMELOG) corrective regimen sliding scale   SubCutaneous at bedtime  insulin lispro (ADMELOG) corrective regimen sliding scale   SubCutaneous three times a day before meals  ketorolac   Injectable 15 milliGRAM(s) IV Push every 6 hours  lactated ringers. 1000 milliLiter(s) (75 mL/Hr) IV Continuous <Continuous>  losartan 100 milliGRAM(s) Oral daily  tamsulosin 0.4 milliGRAM(s) Oral at bedtime    MEDICATIONS  (PRN):  benzocaine/menthol Lozenge 1 Lozenge Oral every 1 hour PRN Sore Throat  dextrose Oral Gel 15 Gram(s) Oral once PRN Blood Glucose LESS THAN 70 milliGRAM(s)/deciliter      PAST MEDICAL & SURGICAL HISTORY:  Diabetes type 2  Hypertension  Benign prostate hyperplasia  High cholesterol  Kidney disorder  CAD (coronary artery disease) s/p stents  History of inguinal hernia repair  History of cardiac cath    SOCIAL HISTORY:  no toxic habits    FAMILY HISTORY:  non-contributory    REVIEW OF SYSTEMS:  As per HPI  [] Unable to obtain due to poor mental status    Vital Signs Last 24 Hrs  T(C): 36.9 (07 Feb 2024 08:52), Max: 37.1 (07 Feb 2024 01:55)  T(F): 98.5 (07 Feb 2024 08:52), Max: 98.8 (07 Feb 2024 01:55)  HR: 64 (07 Feb 2024 08:52) (47 - 83)  BP: 112/53 (07 Feb 2024 08:52) (112/53 - 154/54)  BP(mean): 77 (06 Feb 2024 17:00) (68 - 85)  RR: 16 (07 Feb 2024 08:52) (11 - 20)  SpO2: 96% (07 Feb 2024 08:52) (94% - 100%)    Parameters below as of 07 Feb 2024 08:52  Patient On (Oxygen Delivery Method): room air      CAPILLARY BLOOD GLUCOSE  POCT Blood Glucose.: 197 mg/dL (07 Feb 2024 11:26)  POCT Blood Glucose.: 116 mg/dL (07 Feb 2024 07:27)  POCT Blood Glucose.: 117 mg/dL (06 Feb 2024 22:26)  POCT Blood Glucose.: 133 mg/dL (06 Feb 2024 17:44)  POCT Blood Glucose.: 140 mg/dL (06 Feb 2024 16:27)  POCT Blood Glucose.: 152 mg/dL (06 Feb 2024 13:37)      PHYSICAL EXAM:  GEN: NAD, sitting in chair  HEENT: NC/AT, EOMI, clear sclera/conjunctiva, MMM, hearing intact to voice, neck supple, no JVD  PULMONARY: CTAB, comfortable on RA  CARDIAC: S1S2 RRR  ABDOMEN: soft, non-tender +RLQ NICHOLE drain with serosanguinous drainage, some discoloration of dressing (to be changed by staff)  EXTREMITIES: no c/c/e  NEURO: moving all extremities, non-focal      LABS:                        11.4   6.49  )-----------( 217      ( 07 Feb 2024 07:14 )             34.0     02-07    140  |  105  |  22  ----------------------------<  118<H>  3.9   |  26  |  1.27    Ca    8.2<L>      07 Feb 2024 07:14        Urinalysis Basic - ( 07 Feb 2024 07:14 )    Color: x / Appearance: x / SG: x / pH: x  Gluc: 118 mg/dL / Ketone: x  / Bili: x / Urobili: x   Blood: x / Protein: x / Nitrite: x   Leuk Esterase: x / RBC: x / WBC x   Sq Epi: x / Non Sq Epi: x / Bacteria: x      CAPILLARY BLOOD GLUCOSE  POCT Blood Glucose.: 197 mg/dL (07 Feb 2024 11:26)      RADIOLOGY & ADDITIONAL STUDIES:    Cath lab reports (on [facility]) April 2023, 80% LAD stenosis, s/p DESx2    Imaging:   Personally Reviewed:  [ ] none available                EKG:   Personally Reviewed:  [ ] none available      Consultant(s) notes reviewed:  PST, otpt records via [facility]  Care Discussed with Consultant(s)/Other Providers: urology Warner re: d/c home today, urology Jer[place]o re: removal of drain, to restart plavix tomorrow   Care Discussed with Primary Team.      [] Increased delirium risk  [] Delirium and other risks can be reduced by:          -early ambulation          -minimizing "tethers" - IV, oxygen, catheters, etc          -avoiding hypnotics and sedatives          -maintaining hydration/nutrition          -avoid anticholinergics - diphenhydramine, etc          -pain control          -supportive environment

## 2024-02-08 RX ORDER — CLOPIDOGREL BISULFATE 75 MG/1
1 TABLET, FILM COATED ORAL
Qty: 90 | Refills: 1
Start: 2024-02-08

## 2024-02-10 PROBLEM — I25.10 ATHEROSCLEROTIC HEART DISEASE OF NATIVE CORONARY ARTERY WITHOUT ANGINA PECTORIS: Chronic | Status: ACTIVE | Noted: 2024-01-31

## 2024-02-10 PROBLEM — N28.9 DISORDER OF KIDNEY AND URETER, UNSPECIFIED: Chronic | Status: ACTIVE | Noted: 2024-01-31

## 2024-02-13 LAB — SURGICAL PATHOLOGY STUDY: SIGNIFICANT CHANGE UP

## 2024-02-21 ENCOUNTER — APPOINTMENT (OUTPATIENT)
Age: 84
End: 2024-02-21
Payer: MEDICARE

## 2024-02-21 VITALS
BODY MASS INDEX: 24.33 KG/M2 | HEART RATE: 72 BPM | HEIGHT: 67 IN | WEIGHT: 155 LBS | OXYGEN SATURATION: 98 % | SYSTOLIC BLOOD PRESSURE: 143 MMHG | TEMPERATURE: 98 F | RESPIRATION RATE: 16 BRPM | DIASTOLIC BLOOD PRESSURE: 71 MMHG

## 2024-02-21 DIAGNOSIS — Z87.440 PERSONAL HISTORY OF URINARY (TRACT) INFECTIONS: ICD-10-CM

## 2024-02-21 DIAGNOSIS — N28.89 OTHER SPECIFIED DISORDERS OF KIDNEY AND URETER: ICD-10-CM

## 2024-02-21 PROCEDURE — 99024 POSTOP FOLLOW-UP VISIT: CPT

## 2024-02-21 NOTE — PHYSICAL EXAM
[Normal Appearance] : normal appearance [Well Groomed] : well groomed [General Appearance - In No Acute Distress] : no acute distress [Edema] : no peripheral edema [Respiration, Rhythm And Depth] : normal respiratory rhythm and effort [Exaggerated Use Of Accessory Muscles For Inspiration] : no accessory muscle use [Abdomen Soft] : soft [Abdomen Tenderness] : non-tender [Costovertebral Angle Tenderness] : no ~M costovertebral angle tenderness [Urinary Bladder Findings] : the bladder was normal on palpation [Normal Station and Gait] : the gait and station were normal for the patient's age [] : no rash [No Focal Deficits] : no focal deficits [Oriented To Time, Place, And Person] : oriented to person, place, and time [Affect] : the affect was normal [Mood] : the mood was normal [No Palpable Adenopathy] : no palpable adenopathy [de-identified] : incisions c/d/i

## 2024-02-21 NOTE — HISTORY OF PRESENT ILLNESS
[FreeTextEntry1] : 78 yo Yi speaking M with longstanding history of BPH for which he has been on tamsulosin 0.8mg and finasteride. Patient states that his LUTS has been relatively stable. Does continue to have some weak stream and nocturia (1-2/night) but not very bothersome. Denies any gross hematuria, dysuria, history of UTI, nephrolithiasis.   2/10/21 Interval history: Still taking tamsulosin 0.4mg Pt state LUTS has not been bothersome lately with OK flow and no straining Recently had labwork done by PCP on 1/26 and was told so rise in Cr PSA was 2.28 and normal UA Pending endocrine workup for his DM2  8/6/21 Interval history PCP stopped finasteride and reduced tamsulosin Some slight weakening of stream but no huge effect on LUTS  Still changing around meds for DM2 ED issues - erections not adequate for penetration In the past, was prescribed meds but never got them due to pricing. s/p inguinal hernia repair in the interim  2/11/22 Interval history: Doing well since last visit Still with some nocturia but not bothersome on tamsulosin PCP labwork 1/21 - normal Cr, a1c 6.8, normal UA  8/12/22 Interval history: Doing well on dual medical therapy no nocturia if restricting fluids in the evening  2/17/23 Interval history: some weak flow but no nocturia Doing well on dual medical therapy  9/22/23 Interval history: Had some gross hematuria in July Evaluated by Dr. Giang Normal cysto but CT was significant for a renal mass measuring 2.6cm in right lower pole s/p drug eluting stents in April and currently on blood thinners  12/18/23 Interval history: Had repeat CT which showed stable right lower pole renal mass Denies any flank pain or gross hematuria  1/22/24 Interval history: Doing well since last visit Here with some questions regarding his pending partial nephrectomy  2/21/24 Interval history: s/p partial nephrectomy Doing well since hospital discharge Normal bowel movements No gross hematuria Final path - papillary RCC T1a

## 2024-02-21 NOTE — ASSESSMENT
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 Discussed low likelihood of needing any adjuvant chemo  - BMP check today - Repeat CT in 3 months

## 2024-02-25 LAB
ANION GAP SERPL CALC-SCNC: 12 MMOL/L
BUN SERPL-MCNC: 25 MG/DL
CALCIUM SERPL-MCNC: 9.1 MG/DL
CHLORIDE SERPL-SCNC: 101 MMOL/L
CO2 SERPL-SCNC: 26 MMOL/L
CREAT SERPL-MCNC: 1.1 MG/DL
EGFR: 66 ML/MIN/1.73M2
GLUCOSE SERPL-MCNC: 201 MG/DL
POTASSIUM SERPL-SCNC: 3.9 MMOL/L
SODIUM SERPL-SCNC: 140 MMOL/L

## 2024-06-03 ENCOUNTER — APPOINTMENT (OUTPATIENT)
Age: 84
End: 2024-06-03
Payer: MEDICARE

## 2024-06-03 VITALS
HEART RATE: 58 BPM | OXYGEN SATURATION: 97 % | HEIGHT: 67 IN | WEIGHT: 155 LBS | TEMPERATURE: 97.2 F | DIASTOLIC BLOOD PRESSURE: 57 MMHG | BODY MASS INDEX: 24.33 KG/M2 | SYSTOLIC BLOOD PRESSURE: 97 MMHG

## 2024-06-03 DIAGNOSIS — C64.9 MALIGNANT NEOPLASM OF UNSPECIFIED KIDNEY, EXCEPT RENAL PELVIS: ICD-10-CM

## 2024-06-03 DIAGNOSIS — N52.9 MALE ERECTILE DYSFUNCTION, UNSPECIFIED: ICD-10-CM

## 2024-06-03 DIAGNOSIS — N40.1 BENIGN PROSTATIC HYPERPLASIA WITH LOWER URINARY TRACT SYMPMS: ICD-10-CM

## 2024-06-03 PROCEDURE — 99214 OFFICE O/P EST MOD 30 MIN: CPT

## 2024-06-03 PROCEDURE — G2211 COMPLEX E/M VISIT ADD ON: CPT

## 2024-06-03 RX ORDER — CIPROFLOXACIN HYDROCHLORIDE 500 MG/1
500 TABLET, FILM COATED ORAL
Qty: 14 | Refills: 0 | Status: COMPLETED | COMMUNITY
Start: 2023-07-14 | End: 2024-06-03

## 2024-06-03 NOTE — HISTORY OF PRESENT ILLNESS
[FreeTextEntry1] : 78 yo Serbian speaking M with longstanding history of BPH for which he has been on tamsulosin 0.8mg and finasteride. Patient states that his LUTS has been relatively stable. Does continue to have some weak stream and nocturia (1-2/night) but not very bothersome. Denies any gross hematuria, dysuria, history of UTI, nephrolithiasis.   2/10/21 Interval history: Still taking tamsulosin 0.4mg Pt state LUTS has not been bothersome lately with OK flow and no straining Recently had labwork done by PCP on 1/26 and was told so rise in Cr PSA was 2.28 and normal UA Pending endocrine workup for his DM2  8/6/21 Interval history PCP stopped finasteride and reduced tamsulosin Some slight weakening of stream but no huge effect on LUTS  Still changing around meds for DM2 ED issues - erections not adequate for penetration In the past, was prescribed meds but never got them due to pricing. s/p inguinal hernia repair in the interim  2/11/22 Interval history: Doing well since last visit Still with some nocturia but not bothersome on tamsulosin PCP labwork 1/21 - normal Cr, a1c 6.8, normal UA  8/12/22 Interval history: Doing well on dual medical therapy no nocturia if restricting fluids in the evening  2/17/23 Interval history: some weak flow but no nocturia Doing well on dual medical therapy  9/22/23 Interval history: Had some gross hematuria in July Evaluated by Dr. Giang Normal cysto but CT was significant for a renal mass measuring 2.6cm in right lower pole s/p drug eluting stents in April and currently on blood thinners  12/18/23 Interval history: Had repeat CT which showed stable right lower pole renal mass Denies any flank pain or gross hematuria  1/22/24 Interval history: Doing well since last visit Here with some questions regarding his pending partial nephrectomy  2/21/24 Interval history: s/p partial nephrectomy Doing well since hospital discharge Normal bowel movements No gross hematuria Final path - papillary RCC T1a  6/3/24 Interval history: Doing well since last visit Stable LUTS Only complaint is ED Interested in ED meds but also worried about cardiac issues Postop CT showed some fluid but no enhancing features or evidence of recurrence

## 2024-06-03 NOTE — ASSESSMENT
[FreeTextEntry1] : 85 yo M with RCC s/p partial nephrectomy, stable chronic BPH, ED  - REviewed CT imaging from 5/20/24 through [facility] and confirmed findings as stated above - For now, will plan for annual surveillance imaging  All BPH treatment options were reviewed. This included surveillance, all medical therapeutic options, all outlet procedures including office based, TURP, bipolar TURP, button vaporization, thulium/holmium, suprapubic/retropubic simple (open, robotic) prostatectomy. - Continue dual medical therapy - Reviewed options for ED. Reassured pt that based on current meds, there doesn't seem to be any contraindication to PDE 5 inhibitors. Pt will confirm with cardiologist and let us know if he wants us to send Rx - FU in 6 months

## 2024-06-03 NOTE — PHYSICAL EXAM
[Normal Appearance] : normal appearance [Well Groomed] : well groomed [General Appearance - In No Acute Distress] : no acute distress [Edema] : no peripheral edema [Respiration, Rhythm And Depth] : normal respiratory rhythm and effort [Exaggerated Use Of Accessory Muscles For Inspiration] : no accessory muscle use [Abdomen Soft] : soft [Abdomen Tenderness] : non-tender [Costovertebral Angle Tenderness] : no ~M costovertebral angle tenderness [Urinary Bladder Findings] : the bladder was normal on palpation [Normal Station and Gait] : the gait and station were normal for the patient's age [] : no rash [No Focal Deficits] : no focal deficits [Oriented To Time, Place, And Person] : oriented to person, place, and time [Affect] : the affect was normal [Mood] : the mood was normal [No Palpable Adenopathy] : no palpable adenopathy [de-identified] : incisions c/d/i

## 2024-09-16 ENCOUNTER — RX RENEWAL (OUTPATIENT)
Age: 84
End: 2024-09-16

## (undated) DEVICE — LUBRICATING JELLY ONESHOT 1.25OZ

## (undated) DEVICE — Device

## (undated) DEVICE — ENDOCATCH 10MM SPECIMEN POUCH

## (undated) DEVICE — DRSG TEGADERM 2.5X3"

## (undated) DEVICE — DRAPE INSTRUMENT POUCH 6.75" X 11"

## (undated) DEVICE — XI ARM SCISSOR MONO CURVED

## (undated) DEVICE — SUT POLYSORB 1 27" GS-21 UNDYED

## (undated) DEVICE — DRSG CURITY GAUZE SPONGE 4 X 4" 12-PLY

## (undated) DEVICE — TROCAR SURGIQUEST AIRSEAL 12MMX100MM

## (undated) DEVICE — ENDOCATCH II 15MM

## (undated) DEVICE — XI ARM CLIP APPLIER MEDIUM-LARGE

## (undated) DEVICE — XI ARM GRASPER TIP UP FENESTRATED

## (undated) DEVICE — XI ARM PERMANENT CAUTERY HOOK

## (undated) DEVICE — SUT VLOC 180 2-0 9" GS-21 GREEN

## (undated) DEVICE — XI ARM DISSECTOR CURVED BIPOLAR 8MM

## (undated) DEVICE — ELCTR BOVIE PENCIL SMOKE EVACUATION

## (undated) DEVICE — XI VESSEL SEALER

## (undated) DEVICE — STAPLER COVIDIEN ENDO GIA STANDARD HANDLE

## (undated) DEVICE — SUT VLOC 90 3-0 6" CV-23 UNDYED

## (undated) DEVICE — XI ARM FORCEP FENESTRATED BIPOLAR 8MM

## (undated) DEVICE — ELCTR LAPROSCOPIC FLEXIBLE ARGON COAG ONLY 28CM

## (undated) DEVICE — TROCAR COVIDIEN VERSASTEP PLUS 15MM STANDARD

## (undated) DEVICE — INSUFFLATION NDL COVIDIEN STEP 14G FOR STEP/VERSASTEP

## (undated) DEVICE — WARMING BLANKET LOWER ADULT

## (undated) DEVICE — SUT CLIP LAPRA-TY ABSORBABLE SIZE 0.118 TO 0.12" VIOLET

## (undated) DEVICE — POSITIONER FOAM HEAD CRADLE (PINK)

## (undated) DEVICE — HANDSET ARGON

## (undated) DEVICE — XI ARM PERMANENT CAUTERY SPATULA

## (undated) DEVICE — XI ARM FORCEP PROGRASP 8MM

## (undated) DEVICE — SUT CAPROSYN 4-0 P-12 UNDYED

## (undated) DEVICE — DRSG OPSITE 13.75 X 4"

## (undated) DEVICE — TUBING AIRSEAL TRI-LUMEN FILTERED

## (undated) DEVICE — XI ARM CLIP APPLIER LARGE

## (undated) DEVICE — WARMING BLANKET UPPER ADULT

## (undated) DEVICE — SOL IRR POUR H2O 250ML

## (undated) DEVICE — PACK ROBOTIC LIJ

## (undated) DEVICE — TAPE SILK 3"

## (undated) DEVICE — SUT POLYSORB 0 60" TIES UNDYED

## (undated) DEVICE — XI ARM NEEDLE DRIVER LARGE

## (undated) DEVICE — XI TIP COVER

## (undated) DEVICE — XI DRAPE ARM

## (undated) DEVICE — XI OBTURATOR OPTICAL BLADELESS 8MM

## (undated) DEVICE — ELCTR HANDSWITCHING 5MM ABC

## (undated) DEVICE — XI DRAPE COLUMN

## (undated) DEVICE — SUT POLYSORB 0 36" GU-46

## (undated) DEVICE — TUBING STRYKEFLOW II SUCTION / IRRIGATOR

## (undated) DEVICE — XI ARM FORCEP TENACULUM

## (undated) DEVICE — FOLEY TRAY 16FR 5CC LTX UMETER CLOSED

## (undated) DEVICE — SOL IRR POUR NS 0.9% 500ML

## (undated) DEVICE — D HELP - CLEARVIEW CLEARIFY SYSTEM

## (undated) DEVICE — XI ARM FORCEP MARYLAND BIPOLAR

## (undated) DEVICE — DRAIN RESERVOIR FOR JACKSON PRATT 100CC CARDINAL

## (undated) DEVICE — XI SEAL UNIVERSIAL 5-12MM

## (undated) DEVICE — APPLICATOR VISTASEAL LAP DUAL 45CM FLEX

## (undated) DEVICE — LAP PAD 4 X 18"

## (undated) DEVICE — VENODYNE/SCD SLEEVE CALF MEDIUM

## (undated) DEVICE — DRAIN JACKSON PRATT 10MM FLAT FULL NO TROCAR

## (undated) DEVICE — GLV 7.5 PROTEXIS (WHITE)

## (undated) DEVICE — SUT POLYSORB 2-0 30" V-20 UNDYED